# Patient Record
Sex: MALE | Race: WHITE | NOT HISPANIC OR LATINO | Employment: FULL TIME | URBAN - NONMETROPOLITAN AREA
[De-identification: names, ages, dates, MRNs, and addresses within clinical notes are randomized per-mention and may not be internally consistent; named-entity substitution may affect disease eponyms.]

---

## 2018-02-02 ENCOUNTER — HOSPITAL ENCOUNTER (OUTPATIENT)
Dept: GENERAL RADIOLOGY | Facility: HOSPITAL | Age: 53
Discharge: HOME | End: 2018-02-02
Admitting: NURSE PRACTITIONER

## 2018-02-02 ENCOUNTER — TRANSCRIBE ORDERS (OUTPATIENT)
Dept: LAB | Facility: HOSPITAL | Age: 53
End: 2018-02-02

## 2018-02-02 DIAGNOSIS — R05.9 COUGH: Primary | ICD-10-CM

## 2018-02-02 DIAGNOSIS — R05.9 COUGH: ICD-10-CM

## 2018-02-02 PROCEDURE — 71046 X-RAY EXAM CHEST 2 VIEWS: CPT | Performed by: RADIOLOGY

## 2018-02-02 PROCEDURE — 71046 X-RAY EXAM CHEST 2 VIEWS: CPT

## 2018-04-02 ENCOUNTER — APPOINTMENT (OUTPATIENT)
Dept: CT IMAGING | Facility: HOSPITAL | Age: 53
End: 2018-04-02

## 2018-04-02 ENCOUNTER — HOSPITAL ENCOUNTER (EMERGENCY)
Facility: HOSPITAL | Age: 53
Discharge: HOME | End: 2018-04-02
Attending: EMERGENCY MEDICINE | Admitting: EMERGENCY MEDICINE

## 2018-04-02 VITALS
HEIGHT: 69 IN | RESPIRATION RATE: 18 BRPM | OXYGEN SATURATION: 98 % | DIASTOLIC BLOOD PRESSURE: 90 MMHG | WEIGHT: 187 LBS | TEMPERATURE: 97.9 F | HEART RATE: 86 BPM | SYSTOLIC BLOOD PRESSURE: 135 MMHG | BODY MASS INDEX: 27.7 KG/M2

## 2018-04-02 DIAGNOSIS — N20.0 NEPHROLITHIASIS: Primary | ICD-10-CM

## 2018-04-02 DIAGNOSIS — N28.1 BILATERAL RENAL CYSTS: ICD-10-CM

## 2018-04-02 LAB
ALBUMIN SERPL-MCNC: 4.2 G/DL (ref 3.5–5)
ALBUMIN/GLOB SERPL: 1.4 G/DL (ref 1.5–2.5)
ALP SERPL-CCNC: 62 U/L (ref 40–129)
ALT SERPL W P-5'-P-CCNC: 34 U/L (ref 10–44)
ANION GAP SERPL CALCULATED.3IONS-SCNC: 10.2 MMOL/L (ref 3.6–11.2)
AST SERPL-CCNC: 24 U/L (ref 10–34)
BACTERIA UR QL AUTO: ABNORMAL /HPF
BASOPHILS # BLD AUTO: 0.02 10*3/MM3 (ref 0–0.3)
BASOPHILS NFR BLD AUTO: 0.1 % (ref 0–2)
BILIRUB SERPL-MCNC: 0.6 MG/DL (ref 0.2–1.8)
BILIRUB UR QL STRIP: NEGATIVE
BUN BLD-MCNC: 26 MG/DL (ref 7–21)
BUN/CREAT SERPL: 24.8 (ref 7–25)
CALCIUM SPEC-SCNC: 9.3 MG/DL (ref 7.7–10)
CHLORIDE SERPL-SCNC: 104 MMOL/L (ref 99–112)
CLARITY UR: CLEAR
CO2 SERPL-SCNC: 27.8 MMOL/L (ref 24.3–31.9)
COLOR UR: YELLOW
CREAT BLD-MCNC: 1.05 MG/DL (ref 0.43–1.29)
DEPRECATED RDW RBC AUTO: 43.4 FL (ref 37–54)
EOSINOPHIL # BLD AUTO: 0.02 10*3/MM3 (ref 0–0.7)
EOSINOPHIL NFR BLD AUTO: 0.1 % (ref 0–5)
ERYTHROCYTE [DISTWIDTH] IN BLOOD BY AUTOMATED COUNT: 13.4 % (ref 11.5–14.5)
GFR SERPL CREATININE-BSD FRML MDRD: 74 ML/MIN/1.73
GLOBULIN UR ELPH-MCNC: 3 GM/DL
GLUCOSE BLD-MCNC: 107 MG/DL (ref 70–110)
GLUCOSE UR STRIP-MCNC: NEGATIVE MG/DL
HCT VFR BLD AUTO: 45.6 % (ref 42–52)
HGB BLD-MCNC: 15.5 G/DL (ref 14–18)
HGB UR QL STRIP.AUTO: ABNORMAL
HYALINE CASTS UR QL AUTO: ABNORMAL /LPF
IMM GRANULOCYTES # BLD: 0.21 10*3/MM3 (ref 0–0.03)
IMM GRANULOCYTES NFR BLD: 1.2 % (ref 0–0.5)
KETONES UR QL STRIP: NEGATIVE
LEUKOCYTE ESTERASE UR QL STRIP.AUTO: ABNORMAL
LIPASE SERPL-CCNC: 42 U/L (ref 13–60)
LYMPHOCYTES # BLD AUTO: 1.74 10*3/MM3 (ref 1–3)
LYMPHOCYTES NFR BLD AUTO: 10.3 % (ref 21–51)
MCH RBC QN AUTO: 30.7 PG (ref 27–33)
MCHC RBC AUTO-ENTMCNC: 34 G/DL (ref 33–37)
MCV RBC AUTO: 90.3 FL (ref 80–94)
MONOCYTES # BLD AUTO: 1.51 10*3/MM3 (ref 0.1–0.9)
MONOCYTES NFR BLD AUTO: 8.9 % (ref 0–10)
NEUTROPHILS # BLD AUTO: 13.39 10*3/MM3 (ref 1.4–6.5)
NEUTROPHILS NFR BLD AUTO: 79.4 % (ref 30–70)
NITRITE UR QL STRIP: NEGATIVE
OSMOLALITY SERPL CALC.SUM OF ELEC: 288.4 MOSM/KG (ref 273–305)
PH UR STRIP.AUTO: <=5 [PH] (ref 5–8)
PLATELET # BLD AUTO: 209 10*3/MM3 (ref 130–400)
PMV BLD AUTO: 12.5 FL (ref 6–10)
POTASSIUM BLD-SCNC: 4.3 MMOL/L (ref 3.5–5.3)
PROT SERPL-MCNC: 7.2 G/DL (ref 6–8)
PROT UR QL STRIP: NEGATIVE
RBC # BLD AUTO: 5.05 10*6/MM3 (ref 4.7–6.1)
RBC # UR: ABNORMAL /HPF
REF LAB TEST METHOD: ABNORMAL
SODIUM BLD-SCNC: 142 MMOL/L (ref 135–153)
SP GR UR STRIP: 1.03 (ref 1–1.03)
SQUAMOUS #/AREA URNS HPF: ABNORMAL /HPF
UROBILINOGEN UR QL STRIP: ABNORMAL
WBC NRBC COR # BLD: 16.89 10*3/MM3 (ref 4.5–12.5)
WBC UR QL AUTO: ABNORMAL /HPF

## 2018-04-02 PROCEDURE — 74176 CT ABD & PELVIS W/O CONTRAST: CPT

## 2018-04-02 PROCEDURE — 83690 ASSAY OF LIPASE: CPT | Performed by: PHYSICIAN ASSISTANT

## 2018-04-02 PROCEDURE — 25010000002 KETOROLAC TROMETHAMINE PER 15 MG: Performed by: PHYSICIAN ASSISTANT

## 2018-04-02 PROCEDURE — 25010000002 PROMETHAZINE PER 50 MG: Performed by: PHYSICIAN ASSISTANT

## 2018-04-02 PROCEDURE — 80053 COMPREHEN METABOLIC PANEL: CPT | Performed by: PHYSICIAN ASSISTANT

## 2018-04-02 PROCEDURE — 74176 CT ABD & PELVIS W/O CONTRAST: CPT | Performed by: RADIOLOGY

## 2018-04-02 PROCEDURE — 96375 TX/PRO/DX INJ NEW DRUG ADDON: CPT

## 2018-04-02 PROCEDURE — 96365 THER/PROPH/DIAG IV INF INIT: CPT

## 2018-04-02 PROCEDURE — 99283 EMERGENCY DEPT VISIT LOW MDM: CPT

## 2018-04-02 PROCEDURE — 85025 COMPLETE CBC W/AUTO DIFF WBC: CPT | Performed by: PHYSICIAN ASSISTANT

## 2018-04-02 PROCEDURE — 81001 URINALYSIS AUTO W/SCOPE: CPT | Performed by: PHYSICIAN ASSISTANT

## 2018-04-02 RX ORDER — KETOROLAC TROMETHAMINE 10 MG/1
10 TABLET, FILM COATED ORAL EVERY 6 HOURS PRN
Qty: 12 TABLET | Refills: 0 | Status: SHIPPED | OUTPATIENT
Start: 2018-04-02 | End: 2020-02-18

## 2018-04-02 RX ORDER — KETOROLAC TROMETHAMINE 30 MG/ML
30 INJECTION, SOLUTION INTRAMUSCULAR; INTRAVENOUS ONCE
Status: COMPLETED | OUTPATIENT
Start: 2018-04-02 | End: 2018-04-02

## 2018-04-02 RX ORDER — ATORVASTATIN CALCIUM 80 MG/1
80 TABLET, FILM COATED ORAL DAILY
COMMUNITY
End: 2019-07-22

## 2018-04-02 RX ORDER — ASPIRIN 81 MG/1
81 TABLET, CHEWABLE ORAL DAILY
COMMUNITY
End: 2019-07-22

## 2018-04-02 RX ORDER — LISINOPRIL 20 MG/1
20 TABLET ORAL DAILY
COMMUNITY
End: 2020-02-18 | Stop reason: SDUPTHER

## 2018-04-02 RX ORDER — POTASSIUM CITRATE 10 MEQ/1
10 TABLET, EXTENDED RELEASE ORAL
COMMUNITY
End: 2020-02-18 | Stop reason: SDUPTHER

## 2018-04-02 RX ORDER — SODIUM CHLORIDE 0.9 % (FLUSH) 0.9 %
10 SYRINGE (ML) INJECTION AS NEEDED
Status: DISCONTINUED | OUTPATIENT
Start: 2018-04-02 | End: 2018-04-02 | Stop reason: HOSPADM

## 2018-04-02 RX ORDER — PROMETHAZINE HYDROCHLORIDE 12.5 MG/1
12.5 TABLET ORAL EVERY 6 HOURS PRN
Qty: 12 TABLET | Refills: 0 | Status: SHIPPED | OUTPATIENT
Start: 2018-04-02 | End: 2020-02-18

## 2018-04-02 RX ORDER — OXYCODONE HYDROCHLORIDE AND ACETAMINOPHEN 5; 325 MG/1; MG/1
1 TABLET ORAL EVERY 6 HOURS PRN
Qty: 12 TABLET | Refills: 0 | Status: SHIPPED | OUTPATIENT
Start: 2018-04-02 | End: 2020-02-18

## 2018-04-02 RX ADMIN — PROMETHAZINE HYDROCHLORIDE 12.5 MG: 25 INJECTION INTRAMUSCULAR; INTRAVENOUS at 10:35

## 2018-04-02 RX ADMIN — KETOROLAC TROMETHAMINE 30 MG: 30 INJECTION, SOLUTION INTRAMUSCULAR; INTRAVENOUS at 10:35

## 2018-04-02 NOTE — ED PROVIDER NOTES
Subjective     History provided by:  Patient   used: No    Flank Pain   Pain location:  L flank  Pain quality: sharp    Pain radiates to:  Suprapubic region and groin  Pain severity:  Moderate  Duration:  1 day  Timing:  Constant  Progression:  Worsening  Chronicity:  New  Context: not awakening from sleep and not recent illness    Relieved by:  Nothing  Worsened by:  Movement  Ineffective treatments:  Urination  Associated symptoms: nausea    Associated symptoms: no chest pain, no dysuria, no fever, no hematuria and no vomiting    Nausea:     Severity:  Mild  Risk factors: not elderly and not obese    Risk factors comment:  Hx of kidney stones approx 12 years ago      Review of Systems   Constitutional: Negative.  Negative for fever.   HENT: Negative.    Respiratory: Negative.    Cardiovascular: Negative.  Negative for chest pain.   Gastrointestinal: Positive for nausea. Negative for abdominal pain and vomiting.   Endocrine: Negative.    Genitourinary: Positive for flank pain and urgency. Negative for dysuria and hematuria.   Skin: Negative.    Neurological: Negative.    Psychiatric/Behavioral: Negative.    All other systems reviewed and are negative.      Past Medical History:   Diagnosis Date   • Hyperlipidemia    • Hypertension        No Known Allergies    Past Surgical History:   Procedure Laterality Date   • JOINT REPLACEMENT         History reviewed. No pertinent family history.    Social History     Social History   • Marital status:      Social History Main Topics   • Smoking status: Never Smoker   • Alcohol use No   • Drug use: No     Other Topics Concern   • Not on file           Objective   Physical Exam   Constitutional: He is oriented to person, place, and time. He appears well-developed and well-nourished. No distress.   HENT:   Head: Normocephalic and atraumatic.   Right Ear: External ear normal.   Left Ear: External ear normal.   Nose: Nose normal.   Eyes: Conjunctivae and  EOM are normal. Pupils are equal, round, and reactive to light.   Neck: Normal range of motion. Neck supple. No JVD present. No tracheal deviation present.   Cardiovascular: Normal rate, regular rhythm and normal heart sounds.    No murmur heard.  Pulmonary/Chest: Effort normal and breath sounds normal. No respiratory distress. He has no wheezes.   Abdominal: Soft. Bowel sounds are normal. There is no tenderness. There is CVA tenderness.   Musculoskeletal: Normal range of motion. He exhibits no edema or deformity.   Neurological: He is alert and oriented to person, place, and time. No cranial nerve deficit.   Skin: Skin is warm and dry. No rash noted. He is not diaphoretic. No erythema. No pallor.   Psychiatric: He has a normal mood and affect. His behavior is normal. Thought content normal.   Nursing note and vitals reviewed.      Procedures         ED Course  ED Course   Comment By Time   CT results discussed with patient and wife at bedside. Recommend that he follow up with urologist and PCP for renal cysts. Alberto Gabriel PA-C 04/02 1120                  MDM  Number of Diagnoses or Management Options  Bilateral renal cysts: new and does not require workup  Nephrolithiasis: new and requires workup     Amount and/or Complexity of Data Reviewed  Clinical lab tests: reviewed and ordered  Tests in the radiology section of CPT®: reviewed and ordered  Discuss the patient with other providers: yes  Independent visualization of images, tracings, or specimens: yes    Risk of Complications, Morbidity, and/or Mortality  Presenting problems: moderate  Diagnostic procedures: moderate  Management options: moderate    Patient Progress  Patient progress: stable      Final diagnoses:   Nephrolithiasis   Bilateral renal cysts            Alberto Gabriel PA-C  04/02/18 1131

## 2018-04-02 NOTE — ED NOTES
Pt says he was having some mild pain in left lower abdomen to the testicle during the night and became unbearable this morning.     Rodrick Chappell RN  04/02/18 1019

## 2018-04-20 ENCOUNTER — OFFICE VISIT (OUTPATIENT)
Dept: UROLOGY | Facility: CLINIC | Age: 53
End: 2018-04-20

## 2018-04-20 VITALS — HEIGHT: 69 IN | BODY MASS INDEX: 27.69 KG/M2 | WEIGHT: 186.95 LBS

## 2018-04-20 DIAGNOSIS — N20.1 URETERAL CALCULUS: Primary | ICD-10-CM

## 2018-04-20 DIAGNOSIS — N28.1 RENAL CYST: ICD-10-CM

## 2018-04-20 PROCEDURE — 99204 OFFICE O/P NEW MOD 45 MIN: CPT | Performed by: UROLOGY

## 2018-04-20 RX ORDER — HYDROCODONE BITARTRATE AND ACETAMINOPHEN 5; 325 MG/1; MG/1
TABLET ORAL
Qty: 12 TABLET | Refills: 0 | Status: SHIPPED | OUTPATIENT
Start: 2018-04-20 | End: 2020-02-18

## 2018-04-20 RX ORDER — TAMSULOSIN HYDROCHLORIDE 0.4 MG/1
1 CAPSULE ORAL NIGHTLY
Qty: 14 CAPSULE | Refills: 0 | Status: SHIPPED | OUTPATIENT
Start: 2018-04-20 | End: 2020-02-25 | Stop reason: SDUPTHER

## 2018-04-20 NOTE — PROGRESS NOTES
Chief Complaint:          Chief Complaint   Patient presents with   • Nephrolithiasis       HPI:   52 y.o. male.  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  His last visit 2006 he had a 81/2 mm Achillis treated with lithotripsy in [place],  he had stents at that time -currently, he has no nausea, vomiting, fevers, chills, frequency or urgency there is mild testalgia he has a distal left ureteral calculus.  He has a right lower pole renal calculus he is bilateral renal cystic disease and he has significant prostatic calcifications.  His PSAs of been normal and the 2 range.  He has a positive family history of prostate cancer I we did discuss the 28% risk of prostate cancer given his positive family history in his father.  We also discussed the fact that he'll probably passed the stone.Ureteral calculus-patient has been diagnosed with a ureteral calculus.  We have discussed the various parameters regarding spontaneous passage including the notion that a 2 mm stone has a high likelihood of spontaneous passage versus a larger stone being caught up in the upper areas of the urinary tract.  We also discussed the medical management of stone disease and the use of medical expulsive therapy in the form of Flomax.  This is used in an off label setting.  We discussed the indicators for intervention including  absolute indicators such as sepsis and uncontrollable severe pain as well as  the relative indicators of moderate pain that is well-controlled with various analgesia.  I also talked about nonoperative management including ambulation and increasing fluids and hot tub as being an effective adjuncts in the treatment of a ureteral stone.  Is currently pain-free and I suspect he may have passed the stone    Past Medical History:        Past Medical History:   Diagnosis Date   • Hyperlipidemia    • Hypertension          Current Meds:     Current Outpatient  Prescriptions   Medication Sig Dispense Refill   • aspirin 81 MG chewable tablet Chew 81 mg Daily.     • atorvastatin (LIPITOR) 80 MG tablet Take 80 mg by mouth Daily.     • ketorolac (TORADOL) 10 MG tablet Take 1 tablet by mouth Every 6 (Six) Hours As Needed for Moderate Pain . 12 tablet 0   • lisinopril (PRINIVIL,ZESTRIL) 20 MG tablet Take 20 mg by mouth Daily.     • oxyCODONE-acetaminophen (PERCOCET) 5-325 MG per tablet Take 1 tablet by mouth Every 6 (Six) Hours As Needed for Moderate Pain . 12 tablet 0   • potassium citrate (UROCIT-K) 10 MEQ (1080 MG) CR tablet Take 10 mEq by mouth 3 (Three) Times a Day With Meals.     • promethazine (PHENERGAN) 12.5 MG tablet Take 1 tablet by mouth Every 6 (Six) Hours As Needed for Nausea or Vomiting. 12 tablet 0     No current facility-administered medications for this visit.         Allergies:      No Known Allergies     Past Surgical History:     Past Surgical History:   Procedure Laterality Date   • JOINT REPLACEMENT           Social History:     Social History     Social History   • Marital status:      Spouse name: N/A   • Number of children: N/A   • Years of education: N/A     Occupational History   • Not on file.     Social History Main Topics   • Smoking status: Never Smoker   • Smokeless tobacco: Not on file   • Alcohol use No   • Drug use: No   • Sexual activity: Not on file     Other Topics Concern   • Not on file     Social History Narrative   • No narrative on file       Family History:     No family history on file.    Review of Systems:     Review of Systems   Constitutional: Negative.    HENT: Negative.    Eyes: Negative.    Respiratory: Negative.    Cardiovascular: Negative.    Gastrointestinal: Negative.    Endocrine: Negative.    Genitourinary: Positive for testicular pain.   Musculoskeletal: Negative.    Allergic/Immunologic: Negative.    Neurological: Negative.    Hematological: Negative.    Psychiatric/Behavioral: Negative.        Physical Exam:      Physical Exam   Constitutional: He is oriented to person, place, and time. He appears well-developed and well-nourished.   HENT:   Head: Normocephalic and atraumatic.   Neck: Normal range of motion.   Pulmonary/Chest: Effort normal.   Musculoskeletal: Normal range of motion.   Neurological: He is alert and oriented to person, place, and time.   Skin: Skin is warm and dry.   Psychiatric: He has a normal mood and affect. His behavior is normal. Judgment and thought content normal.   Nursing note and vitals reviewed.      I have reviewed the following portions of the patient's history: allergies, current medications, past family history, past medical history, past social history, past surgical history, problem list and ROS and confirm it's accurate.      Procedure:       Assessment/Plan:   Ureteral calculus-patient has been diagnosed with a ureteral calculus.  We have discussed the various parameters regarding spontaneous passage including the notion that a 2 mm stone has a high likelihood of spontaneous passage versus a larger stone being caught up in the upper areas of the urinary tract.  We also discussed the medical management of stone disease and the use of medical expulsive therapy in the form of Flomax.  This is used in an off label setting.  We discussed the indicators for intervention including  absolute indicators such as sepsis and uncontrollable severe pain as well as  the relative indicators of moderate pain that is well-controlled with various analgesia.  I also talked about nonoperative management including ambulation and increasing fluids and hot tub as being an effective adjuncts in the treatment of a ureteral stone.  Is a 5 mm distal left ureteral stone with a high likelihood of spontaneous passage.  Low TestosteroneThis pleasant male patient presents today with signs and symptoms that are consistent with low testosterone he has positive Temo questionnaire by history this includes both the sexual  and nonsexual side effects.  Sexual side effects include inability to achieve and maintain an erection, in ability to maintain his erection and decreased interest and sexual activity.  Nonsexual symptomatology includes fatigue, difficulty completing a job, tiredness.  He has a discussion of the various forms testosterone available including parenteral, topical, and the form of a patch.  We discussed the efficacy of the gels, and the injections.  As well as the cost and benefits analysis.  We discussed the the studies a talked about heart disease and its effect on prostate cancer both of which are negligible.  He gives verbal consent to proceed with treatment.  He understands the risks and benefits of length he also completed his attempts at fertility he understands the partial effect on spermatogenesis       Patient's Body mass index is 27.59 kg/m². BMI is above normal parameters. Follow-up plan includes:  educational material.          This document has been electronically signed by JOSE M IBANEZ MD April 20, 2018 8:18 AM

## 2018-04-21 PROBLEM — N20.1 URETERAL CALCULUS: Status: ACTIVE | Noted: 2018-04-21

## 2018-04-21 PROBLEM — N28.1 RENAL CYST: Status: ACTIVE | Noted: 2018-04-21

## 2018-07-10 ENCOUNTER — TRANSCRIBE ORDERS (OUTPATIENT)
Dept: ADMINISTRATIVE | Facility: HOSPITAL | Age: 53
End: 2018-07-10

## 2018-07-10 DIAGNOSIS — N50.89 OTHER SPECIFIED DISORDERS OF MALE GENITAL ORGANS: Primary | ICD-10-CM

## 2018-07-20 ENCOUNTER — APPOINTMENT (OUTPATIENT)
Dept: ULTRASOUND IMAGING | Facility: HOSPITAL | Age: 53
End: 2018-07-20

## 2018-07-26 ENCOUNTER — HOSPITAL ENCOUNTER (OUTPATIENT)
Dept: ULTRASOUND IMAGING | Facility: HOSPITAL | Age: 53
Discharge: HOME | End: 2018-07-26
Admitting: FAMILY MEDICINE

## 2018-07-26 DIAGNOSIS — N50.89 OTHER SPECIFIED DISORDERS OF MALE GENITAL ORGANS: ICD-10-CM

## 2018-07-26 PROCEDURE — 76870 US EXAM SCROTUM: CPT | Performed by: RADIOLOGY

## 2018-07-26 PROCEDURE — 76870 US EXAM SCROTUM: CPT

## 2018-10-11 ENCOUNTER — TRANSCRIBE ORDERS (OUTPATIENT)
Dept: ADMINISTRATIVE | Facility: HOSPITAL | Age: 53
End: 2018-10-11

## 2018-10-11 ENCOUNTER — APPOINTMENT (OUTPATIENT)
Dept: LAB | Facility: HOSPITAL | Age: 53
End: 2018-10-11

## 2018-10-11 DIAGNOSIS — Z12.5 SCREENING FOR PROSTATE CANCER: Primary | ICD-10-CM

## 2018-10-11 LAB — PSA SERPL-MCNC: 3.42 NG/ML (ref 0–4)

## 2018-10-11 PROCEDURE — 36415 COLL VENOUS BLD VENIPUNCTURE: CPT | Performed by: NURSE PRACTITIONER

## 2018-10-11 PROCEDURE — 84153 ASSAY OF PSA TOTAL: CPT | Performed by: NURSE PRACTITIONER

## 2019-04-15 RX ORDER — LISINOPRIL 20 MG/1
20 TABLET ORAL DAILY
Qty: 90 TABLET | Refills: 0 | Status: CANCELLED | OUTPATIENT
Start: 2019-04-15

## 2019-07-04 ENCOUNTER — LAB (OUTPATIENT)
Dept: LAB | Facility: HOSPITAL | Age: 54
End: 2019-07-04

## 2019-07-04 ENCOUNTER — TRANSCRIBE ORDERS (OUTPATIENT)
Dept: ADMINISTRATIVE | Facility: HOSPITAL | Age: 54
End: 2019-07-04

## 2019-07-04 DIAGNOSIS — I10 ESSENTIAL (PRIMARY) HYPERTENSION: ICD-10-CM

## 2019-07-04 DIAGNOSIS — I10 ESSENTIAL HYPERTENSION: ICD-10-CM

## 2019-07-04 DIAGNOSIS — I10 ESSENTIAL HYPERTENSION: Primary | ICD-10-CM

## 2019-07-04 LAB
ALBUMIN SERPL-MCNC: 4.4 G/DL (ref 3.5–5.2)
ALBUMIN/GLOB SERPL: 1.6 G/DL
ALP SERPL-CCNC: 56 U/L (ref 39–117)
ALT SERPL W P-5'-P-CCNC: 23 U/L (ref 1–41)
ANION GAP SERPL CALCULATED.3IONS-SCNC: 14.7 MMOL/L (ref 5–15)
AST SERPL-CCNC: 32 U/L (ref 1–40)
BASOPHILS # BLD AUTO: 0.06 10*3/MM3 (ref 0–0.2)
BASOPHILS NFR BLD AUTO: 0.7 % (ref 0–1.5)
BILIRUB SERPL-MCNC: 0.4 MG/DL (ref 0.2–1.2)
BUN BLD-MCNC: 19 MG/DL (ref 6–20)
BUN/CREAT SERPL: 22.1 (ref 7–25)
CALCIUM SPEC-SCNC: 10 MG/DL (ref 8.6–10.5)
CHLORIDE SERPL-SCNC: 101 MMOL/L (ref 98–107)
CHOLEST SERPL-MCNC: 185 MG/DL (ref 0–200)
CO2 SERPL-SCNC: 25.3 MMOL/L (ref 22–29)
CREAT BLD-MCNC: 0.86 MG/DL (ref 0.76–1.27)
DEPRECATED RDW RBC AUTO: 48.1 FL (ref 37–54)
EOSINOPHIL # BLD AUTO: 0.21 10*3/MM3 (ref 0–0.4)
EOSINOPHIL NFR BLD AUTO: 2.3 % (ref 0.3–6.2)
ERYTHROCYTE [DISTWIDTH] IN BLOOD BY AUTOMATED COUNT: 13.4 % (ref 12.3–15.4)
GFR SERPL CREATININE-BSD FRML MDRD: 92 ML/MIN/1.73
GLOBULIN UR ELPH-MCNC: 2.7 GM/DL
GLUCOSE BLD-MCNC: 60 MG/DL (ref 65–99)
HCT VFR BLD AUTO: 45.3 % (ref 37.5–51)
HDLC SERPL-MCNC: 48 MG/DL (ref 40–60)
HGB BLD-MCNC: 14.3 G/DL (ref 13–17.7)
LDLC SERPL CALC-MCNC: 110 MG/DL (ref 0–100)
LDLC/HDLC SERPL: 2.29 {RATIO}
LYMPHOCYTES # BLD AUTO: 2.18 10*3/MM3 (ref 0.7–3.1)
LYMPHOCYTES NFR BLD AUTO: 24.1 % (ref 19.6–45.3)
MCH RBC QN AUTO: 30.6 PG (ref 26.6–33)
MCHC RBC AUTO-ENTMCNC: 31.6 G/DL (ref 31.5–35.7)
MCV RBC AUTO: 97 FL (ref 79–97)
MONOCYTES # BLD AUTO: 0.82 10*3/MM3 (ref 0.1–0.9)
MONOCYTES NFR BLD AUTO: 9.1 % (ref 5–12)
NEUTROPHILS # BLD AUTO: 5.73 10*3/MM3 (ref 1.7–7)
NEUTROPHILS NFR BLD AUTO: 63.4 % (ref 42.7–76)
PLATELET # BLD AUTO: 188 10*3/MM3 (ref 140–450)
PMV BLD AUTO: 12.8 FL (ref 6–12)
POTASSIUM BLD-SCNC: 4.5 MMOL/L (ref 3.5–5.2)
PROT SERPL-MCNC: 7.1 G/DL (ref 6–8.5)
PSA SERPL-MCNC: 4.46 NG/ML (ref 0–4)
RBC # BLD AUTO: 4.67 10*6/MM3 (ref 4.14–5.8)
SODIUM BLD-SCNC: 141 MMOL/L (ref 136–145)
TRIGL SERPL-MCNC: 135 MG/DL (ref 0–150)
VLDLC SERPL-MCNC: 27 MG/DL (ref 5–40)
WBC NRBC COR # BLD: 9.04 10*3/MM3 (ref 3.4–10.8)

## 2019-07-04 PROCEDURE — 80061 LIPID PANEL: CPT | Performed by: FAMILY MEDICINE

## 2019-07-04 PROCEDURE — 84153 ASSAY OF PSA TOTAL: CPT | Performed by: FAMILY MEDICINE

## 2019-07-04 PROCEDURE — 36415 COLL VENOUS BLD VENIPUNCTURE: CPT | Performed by: FAMILY MEDICINE

## 2019-07-04 PROCEDURE — 85025 COMPLETE CBC W/AUTO DIFF WBC: CPT

## 2019-07-04 PROCEDURE — 80053 COMPREHEN METABOLIC PANEL: CPT | Performed by: FAMILY MEDICINE

## 2019-07-22 ENCOUNTER — OFFICE VISIT (OUTPATIENT)
Dept: UROLOGY | Facility: CLINIC | Age: 54
End: 2019-07-22

## 2019-07-22 VITALS — SYSTOLIC BLOOD PRESSURE: 136 MMHG | BODY MASS INDEX: 22.26 KG/M2 | DIASTOLIC BLOOD PRESSURE: 78 MMHG | WEIGHT: 150.8 LBS

## 2019-07-22 DIAGNOSIS — C61 PROSTATE CANCER (HCC): Primary | ICD-10-CM

## 2019-07-22 DIAGNOSIS — R97.20 ELEVATED PROSTATE SPECIFIC ANTIGEN (PSA): ICD-10-CM

## 2019-07-22 DIAGNOSIS — Z80.42 FAMILY HISTORY OF PROSTATE CANCER IN FATHER: ICD-10-CM

## 2019-07-22 DIAGNOSIS — N42.9 DISORDER OF PROSTATE: ICD-10-CM

## 2019-07-22 PROCEDURE — 36415 COLL VENOUS BLD VENIPUNCTURE: CPT | Performed by: UROLOGY

## 2019-07-22 PROCEDURE — 84154 ASSAY OF PSA FREE: CPT | Performed by: UROLOGY

## 2019-07-22 PROCEDURE — 84153 ASSAY OF PSA TOTAL: CPT | Performed by: UROLOGY

## 2019-07-22 PROCEDURE — 99214 OFFICE O/P EST MOD 30 MIN: CPT | Performed by: UROLOGY

## 2019-07-22 NOTE — PROGRESS NOTES
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  His last visit 2006 he had a 81/2 mm Achillis treated with lithotripsy in [place],  he had stents at that time -currently, he has no nausea, vomiting, fevers, chills, frequency or urgency there is mild testalgia he has a distal left ureteral calculus.  He has a right lower pole renal calculus he is bilateral renal cystic disease and he has significant prostatic calcifications.  His PSAs of been normal and the 2 range.  He has a positive family history of prostate cancer I we did discuss the 28% risk of prostate cancer given his positive family history in his father.  We also discussed the fact that he'll probably passed the stone.Ureteral calculus-patient has been diagnosed with a ureteral calculus.  We have discussed the various parameters regarding spontaneous passage including the notion that a 2 mm stone has a high likelihood of spontaneous passage versus a larger stone being caught up in the upper areas of the urinary tract.  We also discussed the medical management of stone disease and the use of medical expulsive therapy in the form of Flomax.  This is used in an off label setting.  We discussed the indicators for intervention including  absolute indicators such as sepsis and uncontrollable severe pain as well as  the relative indicators of moderate pain that is well-controlled with various analgesia.  I also talked about nonoperative management including ambulation and increasing fluids and hot tub as being an effective adjuncts in the treatment of a ureteral stone.  Is currently pain-free and I suspect he may have passed the stone    He returns today 6 months ago his PSA was 3.4202 weeks ago was 4.460.  He is a positive family history and his dad.  He has no lower urinary tract symptomatology has had a previous  lithotripsy.  I am going to recommend a work-up including a free and total PSA.  His digital rectal was unremarkable    Past Medical History:        Past Medical History:   Diagnosis Date   • Hyperlipidemia    • Hypertension          Current Meds:     Current Outpatient Medications   Medication Sig Dispense Refill   • atorvastatin (LIPITOR) 80 MG tablet Take 1 tablet by mouth Daily. 90 tablet 2   • HYDROcodone-acetaminophen (NORCO) 5-325 MG per tablet 1 to 2 Tablets Every 6 Hours as Needed for PAIN 12 tablet 0   • ketorolac (TORADOL) 10 MG tablet Take 1 tablet by mouth Every 6 (Six) Hours As Needed for Moderate Pain . 12 tablet 0   • lisinopril (PRINIVIL,ZESTRIL) 20 MG tablet Take 20 mg by mouth Daily.     • lisinopril (PRINIVIL,ZESTRIL) 20 MG tablet Take 1 tablet by mouth Daily. 90 tablet 0   • lisinopril (PRINIVIL,ZESTRIL) 20 MG tablet Take 1 tablet by mouth Daily. 90 tablet 3   • oxyCODONE-acetaminophen (PERCOCET) 5-325 MG per tablet Take 1 tablet by mouth Every 6 (Six) Hours As Needed for Moderate Pain . 12 tablet 0   • pantoprazole (PROTONIX) 40 MG EC tablet Take 1 tablet by mouth Daily. 30 tablet 3   • potassium citrate (UROCIT-K) 10 MEQ (1080 MG) CR tablet Take 10 mEq by mouth 3 (Three) Times a Day With Meals.     • potassium citrate (UROCIT-K) 10 MEQ (1080 MG) CR tablet Take 1 tablet by mouth 2 (Two) Times a Day. 180 tablet 2   • potassium citrate (UROCIT-K) 10 MEQ (1080 MG) CR tablet Take 1 tablet by mouth 2 (Two) Times a Day. 180 tablet 3   • promethazine (PHENERGAN) 12.5 MG tablet Take 1 tablet by mouth Every 6 (Six) Hours As Needed for Nausea or Vomiting. 12 tablet 0   • propranolol (INDERAL) 40 MG tablet Take 1 tablet by mouth Daily. 30 tablet 3   • rosuvastatin (CRESTOR) 40 MG tablet Take 1 tablet by mouth Daily. 30 tablet 3   • tamsulosin (FLOMAX) 0.4 MG capsule 24 hr capsule Take 1 capsule by mouth Every Night. 14 capsule 0   • Zoster Vac Recomb Adjuvanted 50 MCG/0.5ML reconstituted suspension Inject  0.5 mL into the appropriate muscle as directed by prescriber. 1 each 0     No current facility-administered medications for this visit.         Allergies:      No Known Allergies     Past Surgical History:     Past Surgical History:   Procedure Laterality Date   • JOINT REPLACEMENT           Social History:     Social History     Socioeconomic History   • Marital status:      Spouse name: Not on file   • Number of children: Not on file   • Years of education: Not on file   • Highest education level: Not on file   Tobacco Use   • Smoking status: Never Smoker   Substance and Sexual Activity   • Alcohol use: No   • Drug use: No       Family History:     No family history on file.    Review of Systems:     Review of Systems   Constitutional: Negative.    HENT: Negative.    Eyes: Negative.    Respiratory: Negative.    Cardiovascular: Negative.    Gastrointestinal: Negative.    Endocrine: Negative.    Musculoskeletal: Negative.    Allergic/Immunologic: Negative.    Neurological: Negative.    Hematological: Negative.    Psychiatric/Behavioral: Negative.        Physical Exam:     Physical Exam   Constitutional: He is oriented to person, place, and time. He appears well-developed and well-nourished.   HENT:   Head: Normocephalic and atraumatic.   Eyes: Conjunctivae and EOM are normal. Pupils are equal, round, and reactive to light.   Neck: Normal range of motion.   Cardiovascular: Normal rate, regular rhythm, normal heart sounds and intact distal pulses.   Pulmonary/Chest: Effort normal and breath sounds normal.   Abdominal: Soft. Bowel sounds are normal.   Genitourinary:   Genitourinary Comments: Soft nontender abdomen with no organomegaly, rigidity, or tenderness.  He has normal external genitalia and uncircumcised phallus with a freely movable foreskin bilaterally descended testes without masses there is no inguinal hernias adenopathy or abnormalities he had good rectal tone and a small smooth firm prostate.  There  is no nodularity or any suspicious rectal abnormalities     Musculoskeletal: Normal range of motion.   Neurological: He is alert and oriented to person, place, and time. He has normal reflexes.   Skin: Skin is warm and dry.   Psychiatric: He has a normal mood and affect. His behavior is normal. Judgment and thought content normal.   Nursing note and vitals reviewed.      I have reviewed the following portions of the patient's history: allergies, current medications, past family history, past medical history, past social history, past surgical history, problem list and ROS and confirm it's accurate.      Procedure:       Assessment/Plan:   Elevated prostate specific antigen-we discussed the diagnosis of elevated prostate-specific antigen.  I explained the pathophysiology of PSA.  It is a serine protease that's function in the male reproductive tract is to facilitate the liquefaction of semen.  It is for this reason the body does not want it freely floating in the serum and why typically bound tightly to albumin.  We discussed why we used both a PSA free and total to determine the need for more aggressive therapy I discussed the normal range.  Additionally it was in the range of 1-4 but more recently has been downgraded to something less than 2 or even approaching 1.  I discussed the risk of family history particularly the fact that the average male has a 14% risk of prostate cancer and that in the face of a positive diagnosis in a father it will tablet and any other first-generation relative continued tablet insofar that a father and brother with prostate cancer will produce almost a 50% risk of prostate cancer.  I discussed the use of the temporal use of PSA as the best option for monitoring.  We also discussed the fact that an elevated PSA is an isolated event does not mean that this is prostate cancer and should not engender worry in this regard. I discussed other things that can elevate PSA including  constipation, prostatitis, infection, recent intercourse etc. as well as the risks and benefits associated with this.  Also discussed the fact that this is with a dilutional test and as a consequence of such were present produce slight variations on a single specimen.  Further discussed the risks and benefits of a prostate biopsy as well.  Free and total PSA pending  Family history of prostate cancer-father            Patient's Body mass index is 22.26 kg/m². BMI is within normal parameters. No follow-up required..      Smoking Cessation Counseling:  Never a smoker.  Patient does not currently use any tobacco products.                     This document has been electronically signed by JOSE M IBANEZ MD July 22, 2019 3:50 PM

## 2019-07-24 PROBLEM — Z80.42 FAMILY HISTORY OF PROSTATE CANCER IN FATHER: Status: ACTIVE | Noted: 2019-07-24

## 2019-07-24 PROBLEM — R97.20 ELEVATED PROSTATE SPECIFIC ANTIGEN (PSA): Status: ACTIVE | Noted: 2019-07-24

## 2019-07-24 LAB
PSA FREE MFR SERPL: 16.3 %
PSA FREE SERPL-MCNC: 0.57 NG/ML
PSA SERPL-MCNC: 3.5 NG/ML (ref 0–4)

## 2019-08-19 ENCOUNTER — TELEPHONE (OUTPATIENT)
Dept: UROLOGY | Facility: CLINIC | Age: 54
End: 2019-08-19

## 2020-02-18 ENCOUNTER — OFFICE VISIT (OUTPATIENT)
Dept: UROLOGY | Facility: CLINIC | Age: 55
End: 2020-02-18

## 2020-02-18 VITALS
HEIGHT: 69 IN | WEIGHT: 152 LBS | BODY MASS INDEX: 22.51 KG/M2 | DIASTOLIC BLOOD PRESSURE: 82 MMHG | SYSTOLIC BLOOD PRESSURE: 118 MMHG

## 2020-02-18 DIAGNOSIS — C61 PROSTATE CANCER (HCC): Primary | ICD-10-CM

## 2020-02-18 DIAGNOSIS — R97.20 ELEVATED PROSTATE SPECIFIC ANTIGEN (PSA): ICD-10-CM

## 2020-02-18 PROCEDURE — 99213 OFFICE O/P EST LOW 20 MIN: CPT | Performed by: UROLOGY

## 2020-02-18 PROCEDURE — 84153 ASSAY OF PSA TOTAL: CPT | Performed by: UROLOGY

## 2020-02-18 PROCEDURE — 36415 COLL VENOUS BLD VENIPUNCTURE: CPT | Performed by: UROLOGY

## 2020-02-18 NOTE — PROGRESS NOTES
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  His last visit 2006 he had a 81/2 mm Achillis treated with lithotripsy in [place],  he had stents at that time -currently, he has no nausea, vomiting, fevers, chills, frequency or urgency there is mild testalgia he has a distal left ureteral calculus.  He has a right lower pole renal calculus he is bilateral renal cystic disease and he has significant prostatic calcifications.  His PSAs of been normal and the 2 range.  He has a positive family history of prostate cancer I we did discuss the 28% risk of prostate cancer given his positive family history in his father.  We also discussed the fact that he'll probably passed the stone.Ureteral calculus-patient has been diagnosed with a ureteral calculus.  We have discussed the various parameters regarding spontaneous passage including the notion that a 2 mm stone has a high likelihood of spontaneous passage versus a larger stone being caught up in the upper areas of the urinary tract.  We also discussed the medical management of stone disease and the use of medical expulsive therapy in the form of Flomax.  This is used in an off label setting.  We discussed the indicators for intervention including  absolute indicators such as sepsis and uncontrollable severe pain as well as  the relative indicators of moderate pain that is well-controlled with various analgesia.  I also talked about nonoperative management including ambulation and increasing fluids and hot tub as being an effective adjuncts in the treatment of a ureteral stone.  Is currently pain-free and I suspect he may have passed the stone    He returns today 6 months ago his PSA was 3.4202 weeks ago was 4.460.  He is a positive family history and his dad.  He has no lower urinary tract symptomatology has had a previous  lithotripsy.  I am going to recommend a work-up including a free and total PSA.  His digital rectal was unremarkable  He returns today.  He is doing great he is had no further stones no further urinary problems.  He is very physically fit.  There is no burning, blood in the urine, discharge he has a repeat PSA pending I will notify him of the results I will check him back in 1 year      Past Medical History:        Past Medical History:   Diagnosis Date   • Benign prostatic hyperplasia April 2018   • Elevated PSA July 2019   • Hyperlipidemia    • Hypertension    • Kidney stone 2004   • Polycystic kidney disease April 2018         Current Meds:     Current Outpatient Medications   Medication Sig Dispense Refill   • lisinopril (PRINIVIL,ZESTRIL) 20 MG tablet Take 1 tablet by mouth Daily. 90 tablet 3   • potassium citrate (UROCIT-K) 10 MEQ (1080 MG) CR tablet Take 1 tablet by mouth 2 (Two) Times a Day. 180 tablet 3   • propranolol (INDERAL) 40 MG tablet Take 1 tablet by mouth Daily. (Patient taking differently: Take 40 mg by mouth As Needed.) 30 tablet 3   • rosuvastatin (CRESTOR) 40 MG tablet Take 1 tablet(s) by mouth daily 30 tablet 3   • tamsulosin (FLOMAX) 0.4 MG capsule 24 hr capsule Take 1 capsule by mouth Every Night. 14 capsule 0     No current facility-administered medications for this visit.         Allergies:      No Known Allergies     Past Surgical History:     Past Surgical History:   Procedure Laterality Date   • JOINT REPLACEMENT     • LITHOTRIPSY     • VASECTOMY  2003         Social History:     Social History     Socioeconomic History   • Marital status:      Spouse name: Not on file   • Number of children: Not on file   • Years of education: Not on file   • Highest education level: Not on file   Tobacco Use   • Smoking status: Never Smoker   Substance and Sexual Activity   • Alcohol use: No   • Drug use: No   • Sexual activity: Yes     Partners: Female     Birth control/protection: Surgical        Family History:     Family History   Problem Relation Age of Onset   • Cancer Father         Bladder ca   • Prostate cancer Father    • Hypertension Mother        Review of Systems:     Review of Systems   Constitutional: Negative.    HENT: Negative.    Eyes: Negative.    Respiratory: Negative.    Cardiovascular: Negative.    Gastrointestinal: Negative.    Endocrine: Negative.    Musculoskeletal: Negative.    Allergic/Immunologic: Negative.    Neurological: Negative.    Hematological: Negative.    Psychiatric/Behavioral: Negative.        Physical Exam:     Physical Exam   Constitutional: He is oriented to person, place, and time. He appears well-developed and well-nourished.   HENT:   Head: Normocephalic and atraumatic.   Eyes: Pupils are equal, round, and reactive to light. Conjunctivae and EOM are normal.   Neck: Normal range of motion.   Cardiovascular: Normal rate, regular rhythm, normal heart sounds and intact distal pulses.   Pulmonary/Chest: Effort normal and breath sounds normal.   Abdominal: Soft. Bowel sounds are normal.   Musculoskeletal: Normal range of motion.   Neurological: He is alert and oriented to person, place, and time. He has normal reflexes.   Skin: Skin is warm and dry.   Psychiatric: He has a normal mood and affect. His behavior is normal. Judgment and thought content normal.   Nursing note and vitals reviewed.      I have reviewed the following portions of the patient's history: allergies, current medications, past family history, past medical history, past social history, past surgical history, problem list and ROS and confirm it's accurate.      Procedure:       Assessment/Plan:   Elevated prostate specific antigen-we discussed the diagnosis of elevated prostate-specific antigen.  I explained the pathophysiology of PSA.  It is a serine protease that's function in the male reproductive tract is to facilitate the liquefaction of semen.  It is for this reason the body does not want it  freely floating in the serum and why typically bound tightly to albumin.  We discussed why we used both a PSA free and total to determine the need for more aggressive therapy I discussed the normal range.  Additionally it was in the range of 1-4 but more recently has been downgraded to something less than 2 or even approaching 1.  I discussed the risk of family history particularly the fact that the average male has a 14% risk of prostate cancer and that in the face of a positive diagnosis in a father it will tablet and any other first-generation relative continued tablet insofar that a father and brother with prostate cancer will produce almost a 50% risk of prostate cancer.  I discussed the use of the temporal use of PSA as the best option for monitoring.  We also discussed the fact that an elevated PSA is an isolated event does not mean that this is prostate cancer and should not engender worry in this regard. I discussed other things that can elevate PSA including constipation, prostatitis, infection, recent intercourse etc. as well as the risks and benefits associated with this.  Also discussed the fact that this is with a dilutional test and as a consequence of such were present produce slight variations on a single specimen.  Further discussed the risks and benefits of a prostate biopsy as well.  PSA pending            Patient's Body mass index is 22.45 kg/m². BMI is within normal parameters. No follow-up required..              This document has been electronically signed by JOSE M IBANEZ MD February 18, 2020 3:47 PM

## 2020-02-19 LAB — PSA SERPL-MCNC: 4.66 NG/ML (ref 0–4)

## 2020-02-24 ENCOUNTER — HOSPITAL ENCOUNTER (EMERGENCY)
Facility: HOSPITAL | Age: 55
Discharge: HOME | End: 2020-02-24
Attending: FAMILY MEDICINE | Admitting: FAMILY MEDICINE

## 2020-02-24 ENCOUNTER — APPOINTMENT (OUTPATIENT)
Dept: GENERAL RADIOLOGY | Facility: HOSPITAL | Age: 55
End: 2020-02-24

## 2020-02-24 ENCOUNTER — APPOINTMENT (OUTPATIENT)
Dept: CT IMAGING | Facility: HOSPITAL | Age: 55
End: 2020-02-24

## 2020-02-24 VITALS
DIASTOLIC BLOOD PRESSURE: 72 MMHG | OXYGEN SATURATION: 100 % | BODY MASS INDEX: 21.77 KG/M2 | WEIGHT: 147 LBS | TEMPERATURE: 97.7 F | RESPIRATION RATE: 18 BRPM | HEIGHT: 69 IN | HEART RATE: 61 BPM | SYSTOLIC BLOOD PRESSURE: 125 MMHG

## 2020-02-24 DIAGNOSIS — N20.1 URETEROLITHIASIS: Primary | ICD-10-CM

## 2020-02-24 LAB
ANION GAP SERPL CALCULATED.3IONS-SCNC: 10.4 MMOL/L (ref 5–15)
BACTERIA UR QL AUTO: ABNORMAL /HPF
BASOPHILS # BLD AUTO: 0.04 10*3/MM3 (ref 0–0.2)
BASOPHILS NFR BLD AUTO: 0.5 % (ref 0–1.5)
BILIRUB UR QL STRIP: NEGATIVE
BUN BLD-MCNC: 24 MG/DL (ref 6–20)
BUN/CREAT SERPL: 26.4 (ref 7–25)
CALCIUM SPEC-SCNC: 9.4 MG/DL (ref 8.6–10.5)
CHLORIDE SERPL-SCNC: 103 MMOL/L (ref 98–107)
CLARITY UR: CLEAR
CO2 SERPL-SCNC: 27.6 MMOL/L (ref 22–29)
COLOR UR: YELLOW
CREAT BLD-MCNC: 0.91 MG/DL (ref 0.76–1.27)
DEPRECATED RDW RBC AUTO: 49 FL (ref 37–54)
EOSINOPHIL # BLD AUTO: 0.16 10*3/MM3 (ref 0–0.4)
EOSINOPHIL NFR BLD AUTO: 2 % (ref 0.3–6.2)
ERYTHROCYTE [DISTWIDTH] IN BLOOD BY AUTOMATED COUNT: 13.5 % (ref 12.3–15.4)
GFR SERPL CREATININE-BSD FRML MDRD: 86 ML/MIN/1.73
GLUCOSE BLD-MCNC: 109 MG/DL (ref 65–99)
GLUCOSE UR STRIP-MCNC: NEGATIVE MG/DL
HCT VFR BLD AUTO: 47.2 % (ref 37.5–51)
HGB BLD-MCNC: 14.8 G/DL (ref 13–17.7)
HGB UR QL STRIP.AUTO: ABNORMAL
HYALINE CASTS UR QL AUTO: ABNORMAL /LPF
IMM GRANULOCYTES # BLD AUTO: 0.03 10*3/MM3 (ref 0–0.05)
IMM GRANULOCYTES NFR BLD AUTO: 0.4 % (ref 0–0.5)
KETONES UR QL STRIP: ABNORMAL
LEUKOCYTE ESTERASE UR QL STRIP.AUTO: ABNORMAL
LYMPHOCYTES # BLD AUTO: 1.33 10*3/MM3 (ref 0.7–3.1)
LYMPHOCYTES NFR BLD AUTO: 16.4 % (ref 19.6–45.3)
MCH RBC QN AUTO: 30.6 PG (ref 26.6–33)
MCHC RBC AUTO-ENTMCNC: 31.4 G/DL (ref 31.5–35.7)
MCV RBC AUTO: 97.5 FL (ref 79–97)
MONOCYTES # BLD AUTO: 0.53 10*3/MM3 (ref 0.1–0.9)
MONOCYTES NFR BLD AUTO: 6.5 % (ref 5–12)
NEUTROPHILS # BLD AUTO: 6.01 10*3/MM3 (ref 1.7–7)
NEUTROPHILS NFR BLD AUTO: 74.2 % (ref 42.7–76)
NITRITE UR QL STRIP: NEGATIVE
NRBC BLD AUTO-RTO: 0 /100 WBC (ref 0–0.2)
PH UR STRIP.AUTO: 6 [PH] (ref 5–8)
PLATELET # BLD AUTO: 154 10*3/MM3 (ref 140–450)
PMV BLD AUTO: 12 FL (ref 6–12)
POTASSIUM BLD-SCNC: 4 MMOL/L (ref 3.5–5.2)
PROT UR QL STRIP: ABNORMAL
RBC # BLD AUTO: 4.84 10*6/MM3 (ref 4.14–5.8)
RBC # UR: ABNORMAL /HPF
REF LAB TEST METHOD: ABNORMAL
SODIUM BLD-SCNC: 141 MMOL/L (ref 136–145)
SP GR UR STRIP: 1.02 (ref 1–1.03)
SQUAMOUS #/AREA URNS HPF: ABNORMAL /HPF
UROBILINOGEN UR QL STRIP: ABNORMAL
WBC NRBC COR # BLD: 8.1 10*3/MM3 (ref 3.4–10.8)
WBC UR QL AUTO: ABNORMAL /HPF

## 2020-02-24 PROCEDURE — 36415 COLL VENOUS BLD VENIPUNCTURE: CPT

## 2020-02-24 PROCEDURE — 85025 COMPLETE CBC W/AUTO DIFF WBC: CPT | Performed by: NURSE PRACTITIONER

## 2020-02-24 PROCEDURE — 74018 RADEX ABDOMEN 1 VIEW: CPT

## 2020-02-24 PROCEDURE — 74018 RADEX ABDOMEN 1 VIEW: CPT | Performed by: RADIOLOGY

## 2020-02-24 PROCEDURE — 80048 BASIC METABOLIC PNL TOTAL CA: CPT | Performed by: NURSE PRACTITIONER

## 2020-02-24 PROCEDURE — 81001 URINALYSIS AUTO W/SCOPE: CPT | Performed by: NURSE PRACTITIONER

## 2020-02-24 PROCEDURE — 74176 CT ABD & PELVIS W/O CONTRAST: CPT

## 2020-02-24 PROCEDURE — 99283 EMERGENCY DEPT VISIT LOW MDM: CPT

## 2020-02-24 PROCEDURE — 96375 TX/PRO/DX INJ NEW DRUG ADDON: CPT

## 2020-02-24 PROCEDURE — 74176 CT ABD & PELVIS W/O CONTRAST: CPT | Performed by: RADIOLOGY

## 2020-02-24 PROCEDURE — 25010000002 KETOROLAC TROMETHAMINE PER 15 MG: Performed by: PHYSICIAN ASSISTANT

## 2020-02-24 PROCEDURE — 96374 THER/PROPH/DIAG INJ IV PUSH: CPT

## 2020-02-24 RX ORDER — SODIUM CHLORIDE 0.9 % (FLUSH) 0.9 %
10 SYRINGE (ML) INJECTION AS NEEDED
Status: DISCONTINUED | OUTPATIENT
Start: 2020-02-24 | End: 2020-02-24 | Stop reason: HOSPADM

## 2020-02-24 RX ORDER — OXYCODONE HYDROCHLORIDE AND ACETAMINOPHEN 5; 325 MG/1; MG/1
1 TABLET ORAL EVERY 4 HOURS PRN
Qty: 8 TABLET | Refills: 0 | Status: SHIPPED | OUTPATIENT
Start: 2020-02-24 | End: 2021-02-19

## 2020-02-24 RX ORDER — KETOROLAC TROMETHAMINE 30 MG/ML
15 INJECTION, SOLUTION INTRAMUSCULAR; INTRAVENOUS ONCE
Status: COMPLETED | OUTPATIENT
Start: 2020-02-24 | End: 2020-02-24

## 2020-02-24 RX ORDER — ONDANSETRON 4 MG/1
4 TABLET, FILM COATED ORAL EVERY 6 HOURS PRN
Qty: 15 TABLET | Refills: 0 | Status: SHIPPED | OUTPATIENT
Start: 2020-02-24 | End: 2022-08-04

## 2020-02-24 RX ADMIN — KETOROLAC TROMETHAMINE 15 MG: 30 INJECTION, SOLUTION INTRAMUSCULAR; INTRAVENOUS at 10:46

## 2020-02-24 RX ADMIN — SODIUM CHLORIDE 1000 ML: 9 INJECTION, SOLUTION INTRAVENOUS at 08:24

## 2020-02-24 RX ADMIN — LIDOCAINE HYDROCHLORIDE 100 MG: 10 INJECTION, SOLUTION INFILTRATION; PERINEURAL at 08:24

## 2020-02-24 NOTE — ED PROVIDER NOTES
Subjective   The patient is a 54 year old male that presents to ED for RLQ abdominal pain and right flank pain. This began at 0400 and has been intermittent. He reports previous similar symptoms with kidney stones. He reports nausea but denies vomiting, diarrhea, or chills. He had a lithotripsy several years ago with stenting for stones. The patient was evaluated last week by Dr. Storey but was not having urinary symptoms at that time.       History provided by:  Patient   used: No    Abdominal Pain   Pain location:  RLQ and R flank  Pain quality: sharp and shooting    Pain severity:  Moderate  Onset quality:  Sudden  Duration:  4 hours  Timing:  Intermittent  Progression:  Waxing and waning  Chronicity:  Recurrent  Context: not alcohol use, not medication withdrawal and not recent illness    Relieved by: flomax.  Worsened by:  Nothing  Associated symptoms: nausea    Associated symptoms: no chills, no dysuria, no hematemesis, no hematochezia and no vomiting    Risk factors: no alcohol abuse, has not had multiple surgeries and no recent hospitalization        Review of Systems   Constitutional: Negative for chills.   HENT: Negative.    Eyes: Negative.    Respiratory: Negative.    Cardiovascular: Negative.    Gastrointestinal: Positive for abdominal pain and nausea. Negative for hematemesis, hematochezia and vomiting.   Endocrine: Negative.    Genitourinary: Positive for testicular pain. Negative for dysuria.   Musculoskeletal: Negative.    Skin: Negative.    Allergic/Immunologic: Negative.    Neurological: Negative.    Hematological: Negative.    Psychiatric/Behavioral: Negative.    All other systems reviewed and are negative.      Past Medical History:   Diagnosis Date   • Benign prostatic hyperplasia April 2018   • Elevated PSA July 2019   • Hyperlipidemia    • Hypertension    • Kidney stone 2004   • Polycystic kidney disease April 2018   • Renal cyst        No Known Allergies    Past Surgical  History:   Procedure Laterality Date   • JOINT REPLACEMENT     • LITHOTRIPSY     • VASECTOMY  2003       Family History   Problem Relation Age of Onset   • Cancer Father         Bladder ca   • Prostate cancer Father    • Hypertension Mother        Social History     Socioeconomic History   • Marital status:      Spouse name: Not on file   • Number of children: Not on file   • Years of education: Not on file   • Highest education level: Not on file   Tobacco Use   • Smoking status: Never Smoker   Substance and Sexual Activity   • Alcohol use: No   • Drug use: No   • Sexual activity: Yes     Partners: Female     Birth control/protection: Surgical           Objective   Physical Exam   Constitutional: He is oriented to person, place, and time. He appears well-developed and well-nourished.   HENT:   Head: Normocephalic.   Eyes: Pupils are equal, round, and reactive to light. EOM are normal.   Cardiovascular: Normal rate, regular rhythm, normal heart sounds and intact distal pulses.   Pulmonary/Chest: Effort normal and breath sounds normal.   Abdominal: Soft. Bowel sounds are normal.   Right CVA tenderness   Musculoskeletal: Normal range of motion.   Neurological: He is alert and oriented to person, place, and time.   Skin: Skin is warm and dry. Capillary refill takes less than 2 seconds.   Psychiatric: He has a normal mood and affect. His behavior is normal. Judgment and thought content normal.   Nursing note and vitals reviewed.      Procedures           ED Course  ED Course as of Feb 24 1052   Mon Feb 24, 2020   0815 Took over for Chaitanya.  Patient states that he started having abrupt right-sided flank pain at around 2.  He has had intermittent episodes since.  He states that he did have some sweats and nausea earlier.    [JI]   0913 Pain is controlled at this time.    [JI]   1007 Discussed with Dr. Storey- will see in office tomorrow.    [JI]      ED Course User Index  [JI] Hakeem Alberts PA                                            MDM  Number of Diagnoses or Management Options  Ureterolithiasis: new and requires workup     Amount and/or Complexity of Data Reviewed  Clinical lab tests: reviewed and ordered  Tests in the radiology section of CPT®: reviewed and ordered  Discuss the patient with other providers: yes  Independent visualization of images, tracings, or specimens: yes    Risk of Complications, Morbidity, and/or Mortality  Presenting problems: moderate        Final diagnoses:   Ureterolithiasis            Hakeem Alberts PA  02/24/20 1052

## 2020-02-25 ENCOUNTER — OFFICE VISIT (OUTPATIENT)
Dept: UROLOGY | Facility: CLINIC | Age: 55
End: 2020-02-25

## 2020-02-25 VITALS — WEIGHT: 147 LBS | BODY MASS INDEX: 21.77 KG/M2 | HEIGHT: 69 IN

## 2020-02-25 DIAGNOSIS — N20.1 URETERAL CALCULUS: ICD-10-CM

## 2020-02-25 PROCEDURE — 99213 OFFICE O/P EST LOW 20 MIN: CPT | Performed by: UROLOGY

## 2020-02-25 RX ORDER — TAMSULOSIN HYDROCHLORIDE 0.4 MG/1
1 CAPSULE ORAL NIGHTLY
Qty: 14 CAPSULE | Refills: 0 | Status: SHIPPED | OUTPATIENT
Start: 2020-02-25 | End: 2022-08-04

## 2020-02-25 NOTE — PROGRESS NOTES
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  His last visit 2006 he had a 81/2 mm Achillis treated with lithotripsy in [place],  he had stents at that time -currently, he has no nausea, vomiting, fevers, chills, frequency or urgency there is mild testalgia he has a distal left ureteral calculus.  He has a right lower pole renal calculus he is bilateral renal cystic disease and he has significant prostatic calcifications.  His PSAs of been normal and the 2 range.  He has a positive family history of prostate cancer I we did discuss the 28% risk of prostate cancer given his positive family history in his father.  We also discussed the fact that he'll probably passed the stone.Ureteral calculus-patient has been diagnosed with a ureteral calculus.  We have discussed the various parameters regarding spontaneous passage including the notion that a 2 mm stone has a high likelihood of spontaneous passage versus a larger stone being caught up in the upper areas of the urinary tract.  We also discussed the medical management of stone disease and the use of medical expulsive therapy in the form of Flomax.  This is used in an off label setting.  We discussed the indicators for intervention including  absolute indicators such as sepsis and uncontrollable severe pain as well as  the relative indicators of moderate pain that is well-controlled with various analgesia.  I also talked about nonoperative management including ambulation and increasing fluids and hot tub as being an effective adjuncts in the treatment of a ureteral stone.  Is currently pain-free and I suspect he may have passed the stone    He returns today 6 months ago his PSA was 3.4202 weeks ago was 4.460.  He is a positive family history and his dad.  He has no lower urinary tract symptomatology has had a previous  lithotripsy.  I am going to recommend a work-up including a free and total PSA.  His digital rectal was unremarkable  He returns today.  He is doing great he is had no further stones no further urinary problems.  He is very physically fit.  There is no burning, blood in the urine, discharge he has a repeat PSA pending I will notify him of the results I will check him back in 1 year.  Apparently after being seen he went on a long walk dropped stone in the right proximal ureter.  Is about 5.5 mm and has a good chance of passing is not having a great deal of pain.  He has adequate pain medication he has adequate Flomax I will see him back in 1 week and we will watch him closely I think he will pass the stone.      Past Medical History:        Past Medical History:   Diagnosis Date   • Benign prostatic hyperplasia April 2018   • Elevated PSA July 2019   • Hyperlipidemia    • Hypertension    • Kidney stone 2004   • Polycystic kidney disease April 2018   • Renal cyst          Current Meds:     Current Outpatient Medications   Medication Sig Dispense Refill   • lisinopril (PRINIVIL,ZESTRIL) 20 MG tablet Take 1 tablet by mouth Daily. 90 tablet 3   • ondansetron (ZOFRAN) 4 MG tablet Take 1 tablet by mouth Every 6 (Six) Hours As Needed for Nausea or Vomiting. 15 tablet 0   • oxyCODONE-acetaminophen (PERCOCET) 5-325 MG per tablet Take 1 tablet by mouth Every 4 (Four) Hours As Needed for Severe Pain . 8 tablet 0   • potassium citrate (UROCIT-K) 10 MEQ (1080 MG) CR tablet Take 1 tablet by mouth 2 (Two) Times a Day. 180 tablet 3   • propranolol (INDERAL) 40 MG tablet Take 1 tablet by mouth Daily. (Patient taking differently: Take 40 mg by mouth As Needed.) 30 tablet 3   • rosuvastatin (CRESTOR) 40 MG tablet Take 1 tablet(s) by mouth daily 30 tablet 3   • tamsulosin (FLOMAX) 0.4 MG capsule 24 hr capsule Take 1 capsule by mouth Every Night. 14 capsule 0     No current facility-administered medications for this visit.          Allergies:      No Known Allergies     Past Surgical History:     Past Surgical History:   Procedure Laterality Date   • JOINT REPLACEMENT     • LITHOTRIPSY     • VASECTOMY  2003         Social History:     Social History     Socioeconomic History   • Marital status:      Spouse name: Not on file   • Number of children: Not on file   • Years of education: Not on file   • Highest education level: Not on file   Tobacco Use   • Smoking status: Never Smoker   Substance and Sexual Activity   • Alcohol use: No   • Drug use: No   • Sexual activity: Yes     Partners: Female     Birth control/protection: Surgical       Family History:     Family History   Problem Relation Age of Onset   • Cancer Father         Bladder ca   • Prostate cancer Father    • Hypertension Mother        Review of Systems:     Review of Systems   Constitutional: Negative.    HENT: Negative.    Eyes: Negative.    Respiratory: Negative.    Cardiovascular: Negative.    Gastrointestinal: Negative.    Endocrine: Negative.    Musculoskeletal: Negative.    Allergic/Immunologic: Negative.    Neurological: Negative.    Hematological: Negative.    Psychiatric/Behavioral: Negative.        Physical Exam:     Physical Exam   Constitutional: He is oriented to person, place, and time. He appears well-developed and well-nourished.   HENT:   Head: Normocephalic and atraumatic.   Eyes: Pupils are equal, round, and reactive to light. Conjunctivae and EOM are normal.   Neck: Normal range of motion.   Cardiovascular: Normal rate, regular rhythm, normal heart sounds and intact distal pulses.   Pulmonary/Chest: Effort normal and breath sounds normal.   Abdominal: Soft. Bowel sounds are normal.   Musculoskeletal: Normal range of motion.   Neurological: He is alert and oriented to person, place, and time. He has normal reflexes.   Skin: Skin is warm and dry.   Psychiatric: He has a normal mood and affect. His behavior is normal. Judgment and thought content normal.    Nursing note and vitals reviewed.      I have reviewed the following portions of the patient's history: allergies, current medications, past family history, past medical history, past social history, past surgical history, problem list and ROS and confirm it's accurate.    Procedure:       Assessment/Plan:   Ureteral calculus-patient has been diagnosed with a ureteral calculus.  We have discussed the various parameters regarding spontaneous passage including the notion that a 2 mm stone has a high likelihood of spontaneous passage versus a larger stone being caught up in the upper areas of the urinary tract.  We also discussed the medical management of stone disease and the use of medical expulsive therapy in the form of Flomax.  This is used in an off label setting.  We discussed the indicators for intervention including  absolute indicators such as sepsis and uncontrollable severe pain as well as  the relative indicators of moderate pain that is well-controlled with various analgesia.  I also talked about nonoperative management including ambulation and increasing fluids and hot tub as being an effective adjuncts in the treatment of a ureteral stone.               Patient's Body mass index is 21.7 kg/m². BMI is within normal parameters. No follow-up required..              This document has been electronically signed by JOSE M IBANEZ MD February 25, 2020 9:53 AM

## 2020-03-02 ENCOUNTER — TRANSCRIBE ORDERS (OUTPATIENT)
Dept: ADMINISTRATIVE | Facility: HOSPITAL | Age: 55
End: 2020-03-02

## 2020-03-02 ENCOUNTER — LAB (OUTPATIENT)
Dept: LAB | Facility: HOSPITAL | Age: 55
End: 2020-03-02

## 2020-03-02 DIAGNOSIS — E78.5 HYPERLIPIDEMIA, UNSPECIFIED HYPERLIPIDEMIA TYPE: ICD-10-CM

## 2020-03-02 DIAGNOSIS — E78.5 HYPERLIPIDEMIA, UNSPECIFIED HYPERLIPIDEMIA TYPE: Primary | ICD-10-CM

## 2020-03-02 LAB
ALBUMIN SERPL-MCNC: 4.41 G/DL (ref 3.5–5.2)
ALBUMIN/GLOB SERPL: 1.8 G/DL
ALP SERPL-CCNC: 40 U/L (ref 39–117)
ALT SERPL W P-5'-P-CCNC: 25 U/L (ref 1–41)
ANION GAP SERPL CALCULATED.3IONS-SCNC: 12.2 MMOL/L (ref 5–15)
AST SERPL-CCNC: 29 U/L (ref 1–40)
BASOPHILS # BLD AUTO: 0.04 10*3/MM3 (ref 0–0.2)
BASOPHILS NFR BLD AUTO: 0.5 % (ref 0–1.5)
BILIRUB SERPL-MCNC: 0.4 MG/DL (ref 0.2–1.2)
BUN BLD-MCNC: 25 MG/DL (ref 6–20)
BUN/CREAT SERPL: 26.3 (ref 7–25)
CALCIUM SPEC-SCNC: 9.2 MG/DL (ref 8.6–10.5)
CHLORIDE SERPL-SCNC: 102 MMOL/L (ref 98–107)
CHOLEST SERPL-MCNC: 170 MG/DL (ref 0–200)
CO2 SERPL-SCNC: 27.8 MMOL/L (ref 22–29)
CREAT BLD-MCNC: 0.95 MG/DL (ref 0.76–1.27)
DEPRECATED RDW RBC AUTO: 42.9 FL (ref 37–54)
EOSINOPHIL # BLD AUTO: 0.32 10*3/MM3 (ref 0–0.4)
EOSINOPHIL NFR BLD AUTO: 3.9 % (ref 0.3–6.2)
ERYTHROCYTE [DISTWIDTH] IN BLOOD BY AUTOMATED COUNT: 12.8 % (ref 12.3–15.4)
GFR SERPL CREATININE-BSD FRML MDRD: 82 ML/MIN/1.73
GLOBULIN UR ELPH-MCNC: 2.5 GM/DL
GLUCOSE BLD-MCNC: 101 MG/DL (ref 65–99)
HCT VFR BLD AUTO: 39.3 % (ref 37.5–51)
HDLC SERPL-MCNC: 58 MG/DL (ref 40–60)
HGB BLD-MCNC: 13.4 G/DL (ref 13–17.7)
IMM GRANULOCYTES # BLD AUTO: 0.03 10*3/MM3 (ref 0–0.05)
IMM GRANULOCYTES NFR BLD AUTO: 0.4 % (ref 0–0.5)
LDLC SERPL CALC-MCNC: 86 MG/DL (ref 0–100)
LDLC/HDLC SERPL: 1.48 {RATIO}
LYMPHOCYTES # BLD AUTO: 2.1 10*3/MM3 (ref 0.7–3.1)
LYMPHOCYTES NFR BLD AUTO: 25.5 % (ref 19.6–45.3)
MCH RBC QN AUTO: 31.5 PG (ref 26.6–33)
MCHC RBC AUTO-ENTMCNC: 34.1 G/DL (ref 31.5–35.7)
MCV RBC AUTO: 92.3 FL (ref 79–97)
MONOCYTES # BLD AUTO: 0.79 10*3/MM3 (ref 0.1–0.9)
MONOCYTES NFR BLD AUTO: 9.6 % (ref 5–12)
NEUTROPHILS # BLD AUTO: 4.96 10*3/MM3 (ref 1.7–7)
NEUTROPHILS NFR BLD AUTO: 60.1 % (ref 42.7–76)
NRBC BLD AUTO-RTO: 0 /100 WBC (ref 0–0.2)
PLATELET # BLD AUTO: 189 10*3/MM3 (ref 140–450)
PMV BLD AUTO: 12.1 FL (ref 6–12)
POTASSIUM BLD-SCNC: 4.1 MMOL/L (ref 3.5–5.2)
PROT SERPL-MCNC: 6.9 G/DL (ref 6–8.5)
RBC # BLD AUTO: 4.26 10*6/MM3 (ref 4.14–5.8)
SODIUM BLD-SCNC: 142 MMOL/L (ref 136–145)
TRIGL SERPL-MCNC: 131 MG/DL (ref 0–150)
TSH SERPL DL<=0.05 MIU/L-ACNC: 3.23 UIU/ML (ref 0.27–4.2)
VLDLC SERPL-MCNC: 26.2 MG/DL
WBC NRBC COR # BLD: 8.24 10*3/MM3 (ref 3.4–10.8)

## 2020-03-02 PROCEDURE — 80053 COMPREHEN METABOLIC PANEL: CPT

## 2020-03-02 PROCEDURE — 84443 ASSAY THYROID STIM HORMONE: CPT

## 2020-03-02 PROCEDURE — 85025 COMPLETE CBC W/AUTO DIFF WBC: CPT

## 2020-03-02 PROCEDURE — 80061 LIPID PANEL: CPT

## 2020-03-02 PROCEDURE — 36415 COLL VENOUS BLD VENIPUNCTURE: CPT

## 2020-04-06 ENCOUNTER — TELEPHONE (OUTPATIENT)
Dept: UROLOGY | Facility: CLINIC | Age: 55
End: 2020-04-06

## 2020-04-06 NOTE — TELEPHONE ENCOUNTER
Called patient to reschedule patient out one month. He has requested a refill on Toradol in case he needs it. He said he is also out of flomax, but wasn't sure if he still needed it.

## 2020-05-08 ENCOUNTER — OFFICE VISIT (OUTPATIENT)
Dept: UROLOGY | Facility: CLINIC | Age: 55
End: 2020-05-08

## 2020-05-08 VITALS — TEMPERATURE: 97.8 F | HEIGHT: 69 IN | WEIGHT: 145 LBS | BODY MASS INDEX: 21.48 KG/M2

## 2020-05-08 DIAGNOSIS — N20.1 RIGHT URETERAL CALCULUS: Primary | ICD-10-CM

## 2020-05-08 PROCEDURE — 99213 OFFICE O/P EST LOW 20 MIN: CPT | Performed by: UROLOGY

## 2020-05-08 NOTE — PROGRESS NOTES
Chief Complaint:          Chief Complaint   Patient presents with   • Nephrolithiasis     follow up        HPI:   54 y.o. male well-known to me with a history of an elevated PSA after I saw him last time he dropped a stone because of significant pain it completely resolved he has had no pain after the 6 hours after the appointment but it was severe I am to check a KUB he does not think he passed that he has had no complaints problems he is an active hiker I will notify him of the results at [phone] when least report is available      Past Medical History:        Past Medical History:   Diagnosis Date   • Benign prostatic hyperplasia April 2018   • Elevated PSA July 2019   • Hyperlipidemia    • Hypertension    • Kidney stone 2004   • Polycystic kidney disease April 2018   • Renal cyst          Current Meds:     Current Outpatient Medications   Medication Sig Dispense Refill   • lisinopril (PRINIVIL,ZESTRIL) 20 MG tablet Take 1 tablet by mouth Daily. 90 tablet 3   • meloxicam (MOBIC) 15 MG tablet Take 1 tablet by mouth Daily. 90 tablet 3   • ondansetron (ZOFRAN) 4 MG tablet Take 1 tablet by mouth Every 6 (Six) Hours As Needed for Nausea or Vomiting. 15 tablet 0   • oxyCODONE-acetaminophen (PERCOCET) 5-325 MG per tablet Take 1 tablet by mouth Every 4 (Four) Hours As Needed for Severe Pain . 8 tablet 0   • potassium citrate (UROCIT-K) 10 MEQ (1080 MG) CR tablet Take 1 tablet by mouth 2 (Two) Times a Day. 180 tablet 3   • propranolol (INDERAL) 40 MG tablet Take 1 tablet by mouth Daily. (Patient taking differently: Take 40 mg by mouth As Needed.) 30 tablet 3   • rosuvastatin (CRESTOR) 40 MG tablet Take 1 tablet by mouth Daily. 30 tablet 3   • tamsulosin (FLOMAX) 0.4 MG capsule 24 hr capsule Take 1 capsule by mouth Every Night. 14 capsule 0     No current facility-administered medications for this visit.         Allergies:      No Known Allergies     Past Surgical History:     Past Surgical History:   Procedure Laterality  Date   • JOINT REPLACEMENT     • LITHOTRIPSY     • VASECTOMY  2003         Social History:     Social History     Socioeconomic History   • Marital status:      Spouse name: Not on file   • Number of children: Not on file   • Years of education: Not on file   • Highest education level: Not on file   Tobacco Use   • Smoking status: Never Smoker   Substance and Sexual Activity   • Alcohol use: No   • Drug use: No   • Sexual activity: Yes     Partners: Female     Birth control/protection: Surgical       Family History:     Family History   Problem Relation Age of Onset   • Cancer Father         Bladder ca   • Prostate cancer Father    • Hypertension Mother        Review of Systems:     Review of Systems   Constitutional: Negative.    HENT: Negative.    Eyes: Negative.    Respiratory: Negative.    Cardiovascular: Negative.    Gastrointestinal: Negative.    Endocrine: Negative.    Musculoskeletal: Negative.    Allergic/Immunologic: Negative.    Neurological: Negative.    Hematological: Negative.    Psychiatric/Behavioral: Negative.        Physical Exam:     Physical Exam   Constitutional: He is oriented to person, place, and time. He appears well-developed and well-nourished.   HENT:   Head: Normocephalic and atraumatic.   Eyes: Pupils are equal, round, and reactive to light. Conjunctivae and EOM are normal.   Neck: Normal range of motion.   Cardiovascular: Normal rate, regular rhythm, normal heart sounds and intact distal pulses.   Pulmonary/Chest: Effort normal and breath sounds normal.   Abdominal: Soft. Bowel sounds are normal.   Musculoskeletal: Normal range of motion.   Neurological: He is alert and oriented to person, place, and time. He has normal reflexes.   Skin: Skin is warm and dry.   Psychiatric: He has a normal mood and affect. His behavior is normal. Judgment and thought content normal.   Nursing note and vitals reviewed.      I have reviewed the following portions of the patient's history:  allergies, current medications, past family history, past medical history, past social history, past surgical history, problem list and ROS and confirm it's accurate.      Procedure:       Assessment/Plan:   Ureteral calculus-patient has been diagnosed with a ureteral calculus.  We have discussed the various parameters regarding spontaneous passage including the notion that a 2 mm stone has a high likelihood of spontaneous passage versus a larger stone being caught up in the upper areas of the urinary tract.  We also discussed the medical management of stone disease and the use of medical expulsive therapy in the form of Flomax.  This is used in an off label setting.  We discussed the indicators for intervention including  absolute indicators such as sepsis and uncontrollable severe pain as well as  the relative indicators of moderate pain that is well-controlled with various analgesia.  I also talked about nonoperative management including ambulation and increasing fluids and hot tub as being an effective adjuncts in the treatment of a ureteral stone.  KUB is pending            Patient's Body mass index is 21.41 kg/m². BMI is within normal parameters. No follow-up required..              This document has been electronically signed by JOSE M IBANEZ MD May 8, 2020 15:56

## 2020-05-11 ENCOUNTER — TRANSCRIBE ORDERS (OUTPATIENT)
Dept: ADMINISTRATIVE | Facility: HOSPITAL | Age: 55
End: 2020-05-11

## 2020-05-11 ENCOUNTER — HOSPITAL ENCOUNTER (OUTPATIENT)
Dept: GENERAL RADIOLOGY | Facility: HOSPITAL | Age: 55
Discharge: HOME | End: 2020-05-11
Admitting: EMERGENCY MEDICINE

## 2020-05-11 DIAGNOSIS — R10.9 ABDOMINAL PAIN, UNSPECIFIED ABDOMINAL LOCATION: Primary | ICD-10-CM

## 2020-05-11 PROCEDURE — 74018 RADEX ABDOMEN 1 VIEW: CPT

## 2020-06-07 ENCOUNTER — HOSPITAL ENCOUNTER (EMERGENCY)
Facility: HOSPITAL | Age: 55
Discharge: HOME | End: 2020-06-07
Attending: EMERGENCY MEDICINE | Admitting: EMERGENCY MEDICINE

## 2020-06-07 ENCOUNTER — APPOINTMENT (OUTPATIENT)
Dept: GENERAL RADIOLOGY | Facility: HOSPITAL | Age: 55
End: 2020-06-07

## 2020-06-07 VITALS
SYSTOLIC BLOOD PRESSURE: 154 MMHG | HEIGHT: 69 IN | WEIGHT: 150 LBS | BODY MASS INDEX: 22.22 KG/M2 | OXYGEN SATURATION: 98 % | TEMPERATURE: 98.4 F | HEART RATE: 78 BPM | DIASTOLIC BLOOD PRESSURE: 83 MMHG | RESPIRATION RATE: 18 BRPM

## 2020-06-07 DIAGNOSIS — S86.912A STRAIN OF LEFT KNEE, INITIAL ENCOUNTER: Primary | ICD-10-CM

## 2020-06-07 DIAGNOSIS — M25.462 EFFUSION OF LEFT KNEE: ICD-10-CM

## 2020-06-07 PROCEDURE — 25010000002 HYDROMORPHONE 1 MG/ML SOLUTION: Performed by: NURSE PRACTITIONER

## 2020-06-07 PROCEDURE — 25010000002 ONDANSETRON PER 1 MG: Performed by: NURSE PRACTITIONER

## 2020-06-07 PROCEDURE — 96374 THER/PROPH/DIAG INJ IV PUSH: CPT

## 2020-06-07 PROCEDURE — 99284 EMERGENCY DEPT VISIT MOD MDM: CPT

## 2020-06-07 PROCEDURE — 96372 THER/PROPH/DIAG INJ SC/IM: CPT

## 2020-06-07 PROCEDURE — 73562 X-RAY EXAM OF KNEE 3: CPT

## 2020-06-07 PROCEDURE — 25010000002 DEXAMETHASONE PER 1 MG: Performed by: NURSE PRACTITIONER

## 2020-06-07 PROCEDURE — 96375 TX/PRO/DX INJ NEW DRUG ADDON: CPT

## 2020-06-07 RX ORDER — LIDOCAINE HYDROCHLORIDE 10 MG/ML
5 INJECTION, SOLUTION EPIDURAL; INFILTRATION; INTRACAUDAL; PERINEURAL ONCE
Status: COMPLETED | OUTPATIENT
Start: 2020-06-07 | End: 2020-06-07

## 2020-06-07 RX ORDER — BUPIVACAINE HYDROCHLORIDE 5 MG/ML
5 INJECTION, SOLUTION PERINEURAL ONCE
Status: COMPLETED | OUTPATIENT
Start: 2020-06-07 | End: 2020-06-07

## 2020-06-07 RX ORDER — ONDANSETRON 2 MG/ML
4 INJECTION INTRAMUSCULAR; INTRAVENOUS ONCE
Status: COMPLETED | OUTPATIENT
Start: 2020-06-07 | End: 2020-06-07

## 2020-06-07 RX ORDER — SODIUM CHLORIDE 0.9 % (FLUSH) 0.9 %
10 SYRINGE (ML) INJECTION AS NEEDED
Status: DISCONTINUED | OUTPATIENT
Start: 2020-06-07 | End: 2020-06-07 | Stop reason: HOSPADM

## 2020-06-07 RX ORDER — DEXAMETHASONE SODIUM PHOSPHATE 4 MG/ML
4 INJECTION, SOLUTION INTRA-ARTICULAR; INTRALESIONAL; INTRAMUSCULAR; INTRAVENOUS; SOFT TISSUE ONCE
Status: COMPLETED | OUTPATIENT
Start: 2020-06-07 | End: 2020-06-07

## 2020-06-07 RX ORDER — IBUPROFEN 800 MG/1
800 TABLET ORAL EVERY 6 HOURS PRN
Qty: 25 TABLET | Refills: 0 | Status: SHIPPED | OUTPATIENT
Start: 2020-06-07 | End: 2022-08-04

## 2020-06-07 RX ADMIN — BUPIVACAINE HYDROCHLORIDE 5 ML: 5 INJECTION, SOLUTION PERINEURAL at 20:34

## 2020-06-07 RX ADMIN — DEXAMETHASONE SODIUM PHOSPHATE 4 MG: 4 INJECTION, SOLUTION INTRAMUSCULAR; INTRAVENOUS at 20:34

## 2020-06-07 RX ADMIN — SODIUM CHLORIDE 500 ML: 9 INJECTION, SOLUTION INTRAVENOUS at 20:09

## 2020-06-07 RX ADMIN — ONDANSETRON 4 MG: 2 INJECTION INTRAMUSCULAR; INTRAVENOUS at 20:10

## 2020-06-07 RX ADMIN — LIDOCAINE HYDROCHLORIDE 5 ML: 10 INJECTION, SOLUTION EPIDURAL; INFILTRATION; INTRACAUDAL; PERINEURAL at 20:34

## 2020-06-07 RX ADMIN — HYDROMORPHONE HYDROCHLORIDE 1 MG: 1 INJECTION, SOLUTION INTRAMUSCULAR; INTRAVENOUS; SUBCUTANEOUS at 20:10

## 2020-06-08 ENCOUNTER — TRANSCRIBE ORDERS (OUTPATIENT)
Dept: ADMINISTRATIVE | Facility: HOSPITAL | Age: 55
End: 2020-06-08

## 2020-06-08 ENCOUNTER — EPISODE CHANGES (OUTPATIENT)
Dept: CASE MANAGEMENT | Facility: OTHER | Age: 55
End: 2020-06-08

## 2020-06-08 DIAGNOSIS — M25.562 ACUTE PAIN OF LEFT KNEE: Primary | ICD-10-CM

## 2020-06-08 NOTE — ED NOTES
Dr. Cadena aspirated left knee at this time, approximately 8-10 ML of aspirate noted. Appears to be serosanguinous.       Bakari Piage RN  06/07/20 7086

## 2020-06-08 NOTE — ED PROVIDER NOTES
Subjective     Knee Pain   Location:  Knee  Injury: yes    Mechanism of injury: fall    Fall:     Fall occurred:  Recreating/playing (Patient curled his left ankle a few weeks ago and has intermittently had issues with swelling of his left knee. )  Knee location:  L knee  Pain details:     Quality:  Throbbing and aching    Radiates to:  L leg    Severity:  Severe    Onset quality:  Sudden    Timing:  Intermittent    Progression:  Waxing and waning  Chronicity:  New  Dislocation: no    Prior injury to area:  No  Relieved by:  None tried  Worsened by:  Nothing  Ineffective treatments:  None tried  Associated symptoms: decreased ROM and swelling    Associated symptoms: no fever        Review of Systems   Constitutional: Negative.  Negative for fever.   HENT: Negative.    Respiratory: Negative.    Cardiovascular: Negative.  Negative for chest pain.   Gastrointestinal: Negative.  Negative for abdominal pain.   Endocrine: Negative.    Genitourinary: Negative.  Negative for dysuria.   Skin: Negative.    Neurological: Negative.    Psychiatric/Behavioral: Negative.    All other systems reviewed and are negative.      Past Medical History:   Diagnosis Date   • Benign prostatic hyperplasia April 2018   • Elevated PSA July 2019   • Hyperlipidemia    • Hypertension    • Kidney stone 2004   • Polycystic kidney disease April 2018   • Renal cyst        No Known Allergies    Past Surgical History:   Procedure Laterality Date   • JOINT REPLACEMENT     • LITHOTRIPSY     • VASECTOMY  2003       Family History   Problem Relation Age of Onset   • Cancer Father         Bladder ca   • Prostate cancer Father    • Hypertension Mother        Social History     Socioeconomic History   • Marital status:      Spouse name: Not on file   • Number of children: Not on file   • Years of education: Not on file   • Highest education level: Not on file   Tobacco Use   • Smoking status: Never Smoker   • Smokeless tobacco: Never Used   Substance  and Sexual Activity   • Alcohol use: No   • Drug use: No   • Sexual activity: Yes     Partners: Female     Birth control/protection: Surgical           Objective   Physical Exam   Constitutional: He is oriented to person, place, and time. He appears well-developed and well-nourished. No distress.   HENT:   Head: Normocephalic and atraumatic.   Right Ear: External ear normal.   Left Ear: External ear normal.   Nose: Nose normal.   Eyes: Pupils are equal, round, and reactive to light. Conjunctivae and EOM are normal.   Neck: Normal range of motion. Neck supple. No JVD present. No tracheal deviation present.   Cardiovascular: Normal rate, regular rhythm and normal heart sounds.   No murmur heard.  Pulmonary/Chest: Effort normal and breath sounds normal. No respiratory distress. He has no wheezes.   Abdominal: Soft. Bowel sounds are normal. There is no tenderness.   Musculoskeletal: He exhibits no edema or deformity.        Left knee: He exhibits decreased range of motion and swelling. Tenderness found.   Neurological: He is alert and oriented to person, place, and time. No cranial nerve deficit.   Skin: Skin is warm and dry. No rash noted. He is not diaphoretic. No erythema. No pallor.   Psychiatric: He has a normal mood and affect. His behavior is normal. Thought content normal.   Nursing note and vitals reviewed.      Procedures           ED Course  ED Course as of Jun 07 2331   Sun Jun 07, 2020 2030 Dr. Cadena aspirated around 15ml of sanguineous fluid from the patients knee without difficultly.  Patient tolerated procedure well.      [ANDREINA]   2139 Patient has pain medication left from his last kidney stone, he does not want another prescription at this time.     [ANDREINA]      ED Course User Index  [ANDREINA] Zoraida Rodríguez, APRN                                           MDM  Number of Diagnoses or Management Options  Effusion of left knee:   Strain of left knee, initial encounter:      Amount and/or Complexity of Data  Reviewed  Tests in the radiology section of CPT®: reviewed        Final diagnoses:   Strain of left knee, initial encounter   Effusion of left knee            Zoraida Rodríguez, APRN  06/07/20 6573

## 2020-06-09 ENCOUNTER — HOSPITAL ENCOUNTER (OUTPATIENT)
Dept: MRI IMAGING | Facility: HOSPITAL | Age: 55
Discharge: HOME | End: 2020-06-09
Admitting: PHYSICIAN ASSISTANT

## 2020-06-09 DIAGNOSIS — M25.562 ACUTE PAIN OF LEFT KNEE: ICD-10-CM

## 2020-06-09 PROCEDURE — 73721 MRI JNT OF LWR EXTRE W/O DYE: CPT | Performed by: RADIOLOGY

## 2020-06-09 PROCEDURE — 73721 MRI JNT OF LWR EXTRE W/O DYE: CPT

## 2020-06-11 NOTE — ED PROVIDER NOTES
Subjective   History of Present Illness    Review of Systems    Past Medical History:   Diagnosis Date   • Benign prostatic hyperplasia April 2018   • Elevated PSA July 2019   • Hyperlipidemia    • Hypertension    • Kidney stone 2004   • Polycystic kidney disease April 2018   • Renal cyst        No Known Allergies    Past Surgical History:   Procedure Laterality Date   • JOINT REPLACEMENT     • LITHOTRIPSY     • VASECTOMY  2003       Family History   Problem Relation Age of Onset   • Cancer Father         Bladder ca   • Prostate cancer Father    • Hypertension Mother        Social History     Socioeconomic History   • Marital status:      Spouse name: Not on file   • Number of children: Not on file   • Years of education: Not on file   • Highest education level: Not on file   Tobacco Use   • Smoking status: Never Smoker   • Smokeless tobacco: Never Used   Substance and Sexual Activity   • Alcohol use: No   • Drug use: No   • Sexual activity: Yes     Partners: Female     Birth control/protection: Surgical           Objective   Physical Exam    Joint Aspiration/Injection  Date/Time: 6/11/2020 1:17 PM  Performed by: Edgar Cadena MD  Authorized by: Edgar Cadena MD     Consent:     Consent obtained:  Verbal and written    Consent given by:  Patient    Risks discussed:  Bleeding, incomplete drainage, infection, nerve damage and pain    Alternatives discussed:  No treatment, delayed treatment, alternative treatment and referral  Location:     Location:  Knee    Knee:  L knee  Anesthesia (see MAR for exact dosages):     Anesthesia method:  Local infiltration    Local anesthetic:  Lidocaine 1% w/o epi  Procedure details:     Needle gauge:  22 G    Ultrasound guidance: no      Approach:  Lateral    Aspirate characteristics:  Bloody    Steroid injected: yes      Specimen collected: no    Post-procedure details:     Dressing:  Adhesive bandage    Patient tolerance of procedure:  Tolerated well, no immediate  complications               ED Course  ED Course as of Jun 11 1318   Sun Jun 07, 2020 2030 Dr. Cadena aspirated around 15ml of sanguineous fluid from the patients knee without difficultly.  Patient tolerated procedure well.      [ANDREINA]   2139 Patient has pain medication left from his last kidney stone, he does not want another prescription at this time.     [ANDREINA]      ED Course User Index  [ANDREINA] Zoraida Rodríguez, APRN                                           [facility]    Final diagnoses:   Strain of left knee, initial encounter   Effusion of left knee            Edgar Cadena MD  06/11/20 1460

## 2020-12-19 ENCOUNTER — IMMUNIZATION (OUTPATIENT)
Dept: VACCINE CLINIC | Facility: HOSPITAL | Age: 55
End: 2020-12-19

## 2020-12-19 PROCEDURE — 91300 HC SARSCOV02 VAC 30MCG/0.3ML IM: CPT | Performed by: FAMILY MEDICINE

## 2020-12-19 PROCEDURE — 0001A: CPT | Performed by: FAMILY MEDICINE

## 2021-01-09 ENCOUNTER — IMMUNIZATION (OUTPATIENT)
Dept: VACCINE CLINIC | Facility: HOSPITAL | Age: 56
End: 2021-01-09

## 2021-01-09 PROCEDURE — 91300 HC SARSCOV02 VAC 30MCG/0.3ML IM: CPT | Performed by: FAMILY MEDICINE

## 2021-01-09 PROCEDURE — 0002A: CPT | Performed by: FAMILY MEDICINE

## 2021-01-09 PROCEDURE — 0001A: CPT | Performed by: FAMILY MEDICINE

## 2021-02-12 ENCOUNTER — TRANSCRIBE ORDERS (OUTPATIENT)
Dept: ADMINISTRATIVE | Facility: HOSPITAL | Age: 56
End: 2021-02-12

## 2021-02-12 DIAGNOSIS — H90.A22 SENSORINEURAL HEARING LOSS, UNILATERAL, LEFT EAR, WITH RESTRICTED HEARING ON THE CONTRALATERAL SIDE: Primary | ICD-10-CM

## 2021-02-18 ENCOUNTER — HOSPITAL ENCOUNTER (OUTPATIENT)
Dept: MRI IMAGING | Facility: HOSPITAL | Age: 56
Discharge: HOME | End: 2021-02-18
Admitting: FAMILY MEDICINE

## 2021-02-18 DIAGNOSIS — H90.A22 SENSORINEURAL HEARING LOSS, UNILATERAL, LEFT EAR, WITH RESTRICTED HEARING ON THE CONTRALATERAL SIDE: ICD-10-CM

## 2021-02-18 PROCEDURE — 70551 MRI BRAIN STEM W/O DYE: CPT | Performed by: RADIOLOGY

## 2021-02-18 PROCEDURE — 70551 MRI BRAIN STEM W/O DYE: CPT

## 2021-02-19 ENCOUNTER — OFFICE VISIT (OUTPATIENT)
Dept: UROLOGY | Facility: CLINIC | Age: 56
End: 2021-02-19

## 2021-02-19 VITALS — TEMPERATURE: 97.6 F | BODY MASS INDEX: 22.36 KG/M2 | HEIGHT: 69 IN | WEIGHT: 151 LBS

## 2021-02-19 DIAGNOSIS — R97.20 ELEVATED PROSTATE SPECIFIC ANTIGEN (PSA): Primary | ICD-10-CM

## 2021-02-19 PROCEDURE — 99213 OFFICE O/P EST LOW 20 MIN: CPT | Performed by: UROLOGY

## 2021-02-19 PROCEDURE — 84153 ASSAY OF PSA TOTAL: CPT | Performed by: UROLOGY

## 2021-02-19 RX ORDER — TAMSULOSIN HYDROCHLORIDE 0.4 MG/1
1 CAPSULE ORAL NIGHTLY
Qty: 30 CAPSULE | Refills: 5 | Status: SHIPPED | OUTPATIENT
Start: 2021-02-19 | End: 2022-08-04

## 2021-02-19 RX ORDER — KETOROLAC TROMETHAMINE 10 MG/1
10 TABLET, FILM COATED ORAL EVERY 6 HOURS PRN
Qty: 16 TABLET | Refills: 2 | Status: SHIPPED | OUTPATIENT
Start: 2021-02-19 | End: 2022-08-04

## 2021-02-19 NOTE — PROGRESS NOTES
Chief Complaint:          Chief Complaint   Patient presents with   • Nephrolithiasis     follow up        HPI:   55 y.o. male doing well having no complaints problems burning blood discharge fevers chills etc.  Last year he dropped a stone right after we saw him he is currently stone free staying in fantastic shape with no voiding dysfunction.  PSA is currently pending      Past Medical History:        Past Medical History:   Diagnosis Date   • Benign prostatic hyperplasia April 2018   • Elevated PSA July 2019   • Hyperlipidemia    • Hypertension    • Kidney stone 2004   • Polycystic kidney disease April 2018   • Renal cyst          Current Meds:     Current Outpatient Medications   Medication Sig Dispense Refill   • ELIQUIS 2.5 MG tablet tablet Take 1 tablet by mouth 2 (two) times a day. 30 tablet 0   • ibuprofen (ADVIL,MOTRIN) 800 MG tablet Take 1 tablet by mouth Every 6 (Six) Hours As Needed for Moderate Pain . 25 tablet 0   • lidocaine (LIDODERM) 5 % Apply to knee at bedtime and remove in the morning 30 patch 0   • lisinopril (PRINIVIL,ZESTRIL) 20 MG tablet Take 1 tablet by mouth Daily. 90 tablet 3   • lisinopril-hydrochlorothiazide (PRINZIDE,ZESTORETIC) 20-12.5 MG per tablet Take 1 tablet by mouth Daily. 90 tablet 3   • meloxicam (MOBIC) 15 MG tablet Take 1 tablet by mouth Daily. 90 tablet 3   • ondansetron (ZOFRAN) 4 MG tablet Take 1 tablet by mouth Every 6 (Six) Hours As Needed for Nausea or Vomiting. 15 tablet 0   • potassium citrate (UROCIT-K) 10 MEQ (1080 MG) CR tablet Take 1 tablet by mouth 2 (Two) Times a Day. 180 tablet 3   • propranolol (INDERAL) 40 MG tablet Take 1 tablet by mouth Daily. (Patient taking differently: Take 40 mg by mouth As Needed.) 30 tablet 3   • rosuvastatin (CRESTOR) 40 MG tablet Take 1 tablet(s) by mouth daily 90 tablet 0   • tamsulosin (FLOMAX) 0.4 MG capsule 24 hr capsule Take 1 capsule by mouth Every Night. 14 capsule 0   • valACYclovir (VALTREX) 1000 MG tablet Take 2 tablets  by mouth Every 12 (Twelve) Hours for 2 doses as needed. 10 tablet 1     No current facility-administered medications for this visit.         Allergies:      No Known Allergies     Past Surgical History:     Past Surgical History:   Procedure Laterality Date   • JOINT REPLACEMENT     • LITHOTRIPSY     • VASECTOMY  2003         Social History:     Social History     Socioeconomic History   • Marital status:      Spouse name: Not on file   • Number of children: Not on file   • Years of education: Not on file   • Highest education level: Not on file   Tobacco Use   • Smoking status: Never Smoker   • Smokeless tobacco: Never Used   Substance and Sexual Activity   • Alcohol use: No   • Drug use: No   • Sexual activity: Yes     Partners: Female     Birth control/protection: Surgical       Family History:     Family History   Problem Relation Age of Onset   • Cancer Father         Bladder ca   • Prostate cancer Father    • Hypertension Mother        Review of Systems:     Review of Systems   Constitutional: Negative.    HENT: Negative.    Eyes: Negative.    Respiratory: Negative.    Cardiovascular: Negative.    Gastrointestinal: Negative.    Endocrine: Negative.    Musculoskeletal: Negative.    Allergic/Immunologic: Negative.    Neurological: Negative.    Hematological: Negative.    Psychiatric/Behavioral: Negative.        Physical Exam:     Physical Exam  Vitals signs and nursing note reviewed.   Constitutional:       Appearance: He is well-developed.   HENT:      Head: Normocephalic and atraumatic.   Eyes:      Conjunctiva/sclera: Conjunctivae normal.      Pupils: Pupils are equal, round, and reactive to light.   Neck:      Musculoskeletal: Normal range of motion.   Cardiovascular:      Rate and Rhythm: Normal rate and regular rhythm.      Heart sounds: Normal heart sounds.   Pulmonary:      Effort: Pulmonary effort is normal.      Breath sounds: Normal breath sounds.   Abdominal:      General: Bowel sounds are  normal.      Palpations: Abdomen is soft.   Genitourinary:     Penis: Normal.       Scrotum/Testes: Normal.      Prostate: Normal.      Rectum: Normal.   Musculoskeletal: Normal range of motion.   Skin:     General: Skin is warm and dry.   Neurological:      Mental Status: He is alert and oriented to person, place, and time.      Deep Tendon Reflexes: Reflexes are normal and symmetric.   Psychiatric:         Behavior: Behavior normal.         Thought Content: Thought content normal.         Judgment: Judgment normal.         I have reviewed the following portions of the patient's history: allergies, current medications, past family history, past medical history, past social history, past surgical history, problem list and ROS and confirm it's accurate.      Procedure:       Assessment/Plan:   Elevated prostate specific antigen-we discussed the diagnosis of elevated prostate-specific antigen.  I explained the pathophysiology of PSA.  It is a serine protease that's function in the male reproductive tract is to facilitate the liquefaction of semen.  It is for this reason the body does not want it freely floating in the serum and why typically bound tightly to albumin.  We discussed why we used both a PSA free and total to determine the need for more aggressive therapy I discussed the normal range.  Additionally it was in the range of 1-4 but more recently has been downgraded to something less than 2 or even approaching 1.  I discussed the risk of family history particularly the fact that the average male has a 14% risk of prostate cancer and that in the face of a positive diagnosis in a father it will tablet and any other first-generation relative continued tablet insofar that a father and brother with prostate cancer will produce almost a 50% risk of prostate cancer.  I discussed the use of the temporal use of PSA as the best option for monitoring.  We also discussed the fact that an elevated PSA is an isolated event  does not mean that this is prostate cancer and should not engender worry in this regard. I discussed other things that can elevate PSA including constipation, prostatitis, infection, recent intercourse etc. as well as the risks and benefits associated with this.  Also discussed the fact that this is with a dilutional test and as a consequence of such were present produce slight variations on a single specimen.  Further discussed the risks and benefits of a prostate biopsy as well.  Ureteral calculus-patient has been diagnosed with a ureteral calculus.  We have discussed the various parameters regarding spontaneous passage including the notion that a 2 mm stone has a high likelihood of spontaneous passage versus a larger stone being caught up in the upper areas of the urinary tract.  We also discussed the medical management of stone disease and the use of medical expulsive therapy in the form of Flomax.  This is used in an off label setting.  We discussed the indicators for intervention including  absolute indicators such as sepsis and uncontrollable severe pain as well as  the relative indicators of moderate pain that is well-controlled with various analgesia.  I also talked about nonoperative management including ambulation and increasing fluids and hot tub as being an effective adjuncts in the treatment of a ureteral stone.  Given Toradol and Flomax to have on hand as needed stone passage            Patient's Body mass index is 22.3 kg/m². BMI is within normal parameters. No follow-up required..              This document has been electronically signed by JOSE M IBANEZ MD February 19, 2021 15:41 EST

## 2021-02-20 LAB — PSA SERPL-MCNC: 5.41 NG/ML (ref 0–4)

## 2021-07-16 ENCOUNTER — LAB (OUTPATIENT)
Dept: LAB | Facility: HOSPITAL | Age: 56
End: 2021-07-16

## 2021-07-16 ENCOUNTER — TRANSCRIBE ORDERS (OUTPATIENT)
Dept: ADMINISTRATIVE | Facility: HOSPITAL | Age: 56
End: 2021-07-16

## 2021-07-16 DIAGNOSIS — N21.8 CALCULUS OF OTHER LOWER URINARY TRACT LOCATION: ICD-10-CM

## 2021-07-16 DIAGNOSIS — I10 ESSENTIAL HYPERTENSION, BENIGN: ICD-10-CM

## 2021-07-16 DIAGNOSIS — E78.5 HYPERLIPIDEMIA, UNSPECIFIED HYPERLIPIDEMIA TYPE: ICD-10-CM

## 2021-07-16 DIAGNOSIS — I10 ESSENTIAL HYPERTENSION, BENIGN: Primary | ICD-10-CM

## 2021-07-16 LAB
ALBUMIN SERPL-MCNC: 4.4 G/DL (ref 3.5–5.2)
ALBUMIN/GLOB SERPL: 1.6 G/DL
ALP SERPL-CCNC: 48 U/L (ref 39–117)
ALT SERPL W P-5'-P-CCNC: 24 U/L (ref 1–41)
ANION GAP SERPL CALCULATED.3IONS-SCNC: 11.5 MMOL/L (ref 5–15)
AST SERPL-CCNC: 27 U/L (ref 1–40)
BASOPHILS # BLD AUTO: 0.05 10*3/MM3 (ref 0–0.2)
BASOPHILS NFR BLD AUTO: 0.7 % (ref 0–1.5)
BILIRUB SERPL-MCNC: 0.3 MG/DL (ref 0–1.2)
BUN SERPL-MCNC: 21 MG/DL (ref 6–20)
BUN/CREAT SERPL: 22.3 (ref 7–25)
CALCIUM SPEC-SCNC: 9.8 MG/DL (ref 8.6–10.5)
CHLORIDE SERPL-SCNC: 102 MMOL/L (ref 98–107)
CHOLEST SERPL-MCNC: 190 MG/DL (ref 0–200)
CO2 SERPL-SCNC: 28.5 MMOL/L (ref 22–29)
CREAT SERPL-MCNC: 0.94 MG/DL (ref 0.76–1.27)
DEPRECATED RDW RBC AUTO: 42.8 FL (ref 37–54)
EOSINOPHIL # BLD AUTO: 0.54 10*3/MM3 (ref 0–0.4)
EOSINOPHIL NFR BLD AUTO: 7.9 % (ref 0.3–6.2)
ERYTHROCYTE [DISTWIDTH] IN BLOOD BY AUTOMATED COUNT: 12.5 % (ref 12.3–15.4)
GFR SERPL CREATININE-BSD FRML MDRD: 83 ML/MIN/1.73
GLOBULIN UR ELPH-MCNC: 2.7 GM/DL
GLUCOSE SERPL-MCNC: 74 MG/DL (ref 65–99)
HCT VFR BLD AUTO: 44.6 % (ref 37.5–51)
HDLC SERPL-MCNC: 49 MG/DL (ref 40–60)
HGB BLD-MCNC: 14.9 G/DL (ref 13–17.7)
IMM GRANULOCYTES # BLD AUTO: 0.02 10*3/MM3 (ref 0–0.05)
IMM GRANULOCYTES NFR BLD AUTO: 0.3 % (ref 0–0.5)
LDLC SERPL CALC-MCNC: 107 MG/DL (ref 0–100)
LDLC/HDLC SERPL: 2.07 {RATIO}
LYMPHOCYTES # BLD AUTO: 2.36 10*3/MM3 (ref 0.7–3.1)
LYMPHOCYTES NFR BLD AUTO: 34.7 % (ref 19.6–45.3)
MCH RBC QN AUTO: 31 PG (ref 26.6–33)
MCHC RBC AUTO-ENTMCNC: 33.4 G/DL (ref 31.5–35.7)
MCV RBC AUTO: 92.7 FL (ref 79–97)
MONOCYTES # BLD AUTO: 0.64 10*3/MM3 (ref 0.1–0.9)
MONOCYTES NFR BLD AUTO: 9.4 % (ref 5–12)
NEUTROPHILS NFR BLD AUTO: 3.2 10*3/MM3 (ref 1.7–7)
NEUTROPHILS NFR BLD AUTO: 47 % (ref 42.7–76)
NRBC BLD AUTO-RTO: 0 /100 WBC (ref 0–0.2)
PLATELET # BLD AUTO: 176 10*3/MM3 (ref 140–450)
PMV BLD AUTO: 12.3 FL (ref 6–12)
POTASSIUM SERPL-SCNC: 3.8 MMOL/L (ref 3.5–5.2)
PROT SERPL-MCNC: 7.1 G/DL (ref 6–8.5)
PSA SERPL-MCNC: 5.75 NG/ML (ref 0–4)
RBC # BLD AUTO: 4.81 10*6/MM3 (ref 4.14–5.8)
SODIUM SERPL-SCNC: 142 MMOL/L (ref 136–145)
TRIGL SERPL-MCNC: 198 MG/DL (ref 0–150)
TSH SERPL DL<=0.05 MIU/L-ACNC: 4.13 UIU/ML (ref 0.27–4.2)
VLDLC SERPL-MCNC: 34 MG/DL (ref 5–40)
WBC # BLD AUTO: 6.81 10*3/MM3 (ref 3.4–10.8)

## 2021-07-16 PROCEDURE — 84443 ASSAY THYROID STIM HORMONE: CPT

## 2021-07-16 PROCEDURE — 80053 COMPREHEN METABOLIC PANEL: CPT

## 2021-07-16 PROCEDURE — 80061 LIPID PANEL: CPT

## 2021-07-16 PROCEDURE — 85025 COMPLETE CBC W/AUTO DIFF WBC: CPT

## 2021-07-16 PROCEDURE — 36415 COLL VENOUS BLD VENIPUNCTURE: CPT

## 2021-07-16 PROCEDURE — G0103 PSA SCREENING: HCPCS

## 2021-10-08 ENCOUNTER — IMMUNIZATION (OUTPATIENT)
Dept: VACCINE CLINIC | Facility: HOSPITAL | Age: 56
End: 2021-10-08

## 2021-10-08 PROCEDURE — 0004A ADM SARSCOV2 30MCG/0.3ML BOOSTER: CPT | Performed by: INTERNAL MEDICINE

## 2021-10-08 PROCEDURE — 91300 HC SARSCOV02 VAC 30MCG/0.3ML IM: CPT | Performed by: INTERNAL MEDICINE

## 2021-10-08 PROCEDURE — 0003A: CPT | Performed by: INTERNAL MEDICINE

## 2022-01-05 ENCOUNTER — LAB (OUTPATIENT)
Dept: LAB | Facility: HOSPITAL | Age: 57
End: 2022-01-05

## 2022-01-05 ENCOUNTER — TRANSCRIBE ORDERS (OUTPATIENT)
Dept: ADMINISTRATIVE | Facility: HOSPITAL | Age: 57
End: 2022-01-05

## 2022-01-05 DIAGNOSIS — R50.9 FEVER, UNSPECIFIED: Primary | ICD-10-CM

## 2022-01-05 LAB
FLUAV RNA RESP QL NAA+PROBE: NOT DETECTED
FLUBV RNA RESP QL NAA+PROBE: NOT DETECTED
SARS-COV-2 RNA RESP QL NAA+PROBE: NOT DETECTED

## 2022-01-05 PROCEDURE — 87636 SARSCOV2 & INF A&B AMP PRB: CPT | Performed by: PHYSICIAN ASSISTANT

## 2022-01-05 PROCEDURE — C9803 HOPD COVID-19 SPEC COLLECT: HCPCS

## 2022-03-03 ENCOUNTER — OFFICE VISIT (OUTPATIENT)
Dept: UROLOGY | Facility: CLINIC | Age: 57
End: 2022-03-03

## 2022-03-03 VITALS — BODY MASS INDEX: 22.82 KG/M2 | HEIGHT: 69 IN | WEIGHT: 154.1 LBS

## 2022-03-03 DIAGNOSIS — R97.20 ELEVATED PROSTATE SPECIFIC ANTIGEN (PSA): Primary | ICD-10-CM

## 2022-03-03 DIAGNOSIS — Z80.42 FAMILY HISTORY OF PROSTATE CANCER IN FATHER: ICD-10-CM

## 2022-03-03 PROCEDURE — 84153 ASSAY OF PSA TOTAL: CPT | Performed by: UROLOGY

## 2022-03-03 PROCEDURE — 99213 OFFICE O/P EST LOW 20 MIN: CPT | Performed by: UROLOGY

## 2022-03-03 RX ORDER — SULFAMETHOXAZOLE AND TRIMETHOPRIM 800; 160 MG/1; MG/1
1 TABLET ORAL 2 TIMES DAILY
Qty: 40 TABLET | Refills: 2 | Status: SHIPPED | OUTPATIENT
Start: 2022-03-03 | End: 2022-08-04

## 2022-03-03 NOTE — PROGRESS NOTES
Chief Complaint:          Chief Complaint   Patient presents with   • Elevated PSA       HPI:   56 y.o. male  doing well having no complaints problems burning blood discharge fevers chills etc.  Last year he dropped a stone right after we saw him he is currently stone free staying in fantastic shape with no voiding dysfunction.  Turns today.  He is doing well.  Sometimes he gets occasional increase in his symptomatology consistent with chronic prostatitis.  If his PSA goes up again I  recommend a course of finasteride.      Past Medical History:        Past Medical History:   Diagnosis Date   • Benign prostatic hyperplasia April 2018   • Elevated PSA July 2019   • Hyperlipidemia    • Hypertension    • Kidney stone 2004   • Polycystic kidney disease April 2018   • Renal cyst          Current Meds:     Current Outpatient Medications   Medication Sig Dispense Refill   • ELIQUIS 2.5 MG tablet tablet Take 1 tablet by mouth 2 (two) times a day. 30 tablet 0   • ibuprofen (ADVIL,MOTRIN) 800 MG tablet Take 1 tablet by mouth Every 6 (Six) Hours As Needed for Moderate Pain . 25 tablet 0   • ketorolac (TORADOL) 10 MG tablet Take 1 tablet by mouth Every 6 (Six) Hours As Needed for Moderate Pain . 16 tablet 2   • lidocaine (LIDODERM) 5 % Apply to knee at bedtime and remove in the morning 30 patch 0   • lisinopril (PRINIVIL,ZESTRIL) 20 MG tablet Take 1 tablet by mouth Daily. 90 tablet 3   • lisinopril-hydrochlorothiazide (PRINZIDE,ZESTORETIC) 20-12.5 MG per tablet Take 1 tablet by mouth Daily. 90 tablet 3   • meloxicam (MOBIC) 15 MG tablet Take 1 tablet by mouth Daily. 90 tablet 3   • meloxicam (MOBIC) 15 MG tablet Take 1 tablet by mouth Daily. 90 tablet 3   • ofloxacin (OCUFLOX) 0.3 % ophthalmic solution Instill 1-2 drops into the affected eye(s) 3 times daily for 7 days. 5 mL 0   • ondansetron (ZOFRAN) 4 MG tablet Take 1 tablet by mouth Every 6 (Six) Hours As Needed for Nausea or Vomiting. 15 tablet 0   • potassium citrate  (UROCIT-K) 10 MEQ (1080 MG) CR tablet Take 1 tablet by mouth 2 (two) times a day. 180 tablet 3   • propranolol (INDERAL) 40 MG tablet Take 1 tablet by mouth Daily. (Patient taking differently: Take 40 mg by mouth As Needed.) 30 tablet 3   • rosuvastatin (CRESTOR) 40 MG tablet Take 1 tablet by mouth Daily. 90 tablet 3   • rosuvastatin (CRESTOR) 40 MG tablet Take 1 tablet by mouth Daily. 90 tablet 3   • tamsulosin (FLOMAX) 0.4 MG capsule 24 hr capsule Take 1 capsule by mouth Every Night. 14 capsule 0   • tamsulosin (Flomax) 0.4 MG capsule 24 hr capsule Take 1 capsule by mouth Every Night. 30 capsule 5   • valACYclovir (VALTREX) 1000 MG tablet Take 2 tablets by mouth Every 12 (Twelve) Hours for 2 doses as needed. 10 tablet 1     No current facility-administered medications for this visit.        Allergies:      No Known Allergies     Past Surgical History:     Past Surgical History:   Procedure Laterality Date   • JOINT REPLACEMENT     • LITHOTRIPSY     • VASECTOMY  2003         Social History:     Social History     Socioeconomic History   • Marital status:    Tobacco Use   • Smoking status: Never Smoker   • Smokeless tobacco: Never Used   Substance and Sexual Activity   • Alcohol use: No   • Drug use: No   • Sexual activity: Yes     Partners: Female     Birth control/protection: Surgical       Family History:     Family History   Problem Relation Age of Onset   • Cancer Father         Bladder ca   • Prostate cancer Father    • Hypertension Mother        Review of Systems:     Review of Systems   Constitutional: Negative.    HENT: Negative.    Eyes: Negative.    Respiratory: Negative.    Cardiovascular: Negative.    Gastrointestinal: Negative.    Endocrine: Negative.    Musculoskeletal: Negative.    Allergic/Immunologic: Negative.    Neurological: Negative.    Hematological: Negative.    Psychiatric/Behavioral: Negative.        Physical Exam:     Physical Exam  Vitals and nursing note reviewed.    Constitutional:       Appearance: He is well-developed.   HENT:      Head: Normocephalic and atraumatic.   Eyes:      Conjunctiva/sclera: Conjunctivae normal.      Pupils: Pupils are equal, round, and reactive to light.   Cardiovascular:      Rate and Rhythm: Normal rate and regular rhythm.      Heart sounds: Normal heart sounds.   Pulmonary:      Effort: Pulmonary effort is normal.      Breath sounds: Normal breath sounds.   Abdominal:      General: Bowel sounds are normal.      Palpations: Abdomen is soft.   Musculoskeletal:         General: Normal range of motion.      Cervical back: Normal range of motion.   Skin:     General: Skin is warm and dry.   Neurological:      Mental Status: He is alert and oriented to person, place, and time.      Deep Tendon Reflexes: Reflexes are normal and symmetric.   Psychiatric:         Behavior: Behavior normal.         Thought Content: Thought content normal.         Judgment: Judgment normal.         I have reviewed the following portions of the patient's history: Allergies, current medications, past family history, past medical history, past social history, past surgical history, problem list, and ROS and confirm it is accurate.      Procedure:       Assessment/Plan:   Prostatitis-we discussed the differential diagnosis of prostatitis including the National Institutes of Health classification system I through IV.  I discussed that type I prostatitis is acute bacterial prostatitis and associated with high fevers, typically hematuria, and severe pain with voiding.  We discussed the fact that it necessitates 6 weeks of chronic antibiotics to be sure it doesn't turn into a chronic bacterial prostatitis that can have lifelong ramifications.  We discussed National Institutes of Health type II chronic bacterial prostatitis.  We discussed the fact that this a chronic bacterial infection of the prostate that often requires suppressive antibiotics.  We discussed type III being  related more to nonspecific urethritis, as well as tight for being related to finding inflammation and a biopsy in the absence of symptomatology.  I discussed the diagnostic strategies including the VB 1 through 4 urine culture and discussed empiric therapy.  I emphasized that with the use of chronic antibiotics, I strongly recommended the concomitant use of probiotics.  Additionally, we discussed the various antibiotics used and the risks and benefits associated with each, particularly the use of long-term ciprofloxacin and the effect on joints and collagen in human beings.  He has occasional symptomatology consistent with a flareup of his known chronic prostatitis I gave him additional Septra to have on hand should he have a flare.  BPH: Discussed the pathophysiology of BPH and obstruction.  We discussed the static and dynamic effects of BPH as well as using 5 alpha reductase inhibitors versus alpha blockade.  We discussed the indications for transurethral surgery as well and/ or other therapeutic options available including all of the newer techniques.  Continues on alpha blockade  Elevated prostate specific antigen-we discussed the diagnosis of elevated prostate-specific antigen.  I explained the pathophysiology of PSA.  It is a serine protease that's function in the male reproductive tract is to facilitate the liquefaction of semen.  It is for this reason the body does not want it freely floating in the serum and why typically bound tightly to albumin.  We discussed why we used both a PSA free and total to determine the need for more aggressive therapy. I discussed the normal range.  Additionally, it was in the range of 1 to 4, but more recently has been downgraded to something less than 2 or even approaching 1.  I discussed the risk of family history, particularly the fact that the average male has a 14% risk of prostate cancer and that in the face of a positive diagnosis in a father it will tablet and any  other first-generation relative continued tablet insofar that a father and brother with prostate cancer will produce almost a 50% risk of prostate cancer.  I discussed the use of the temporal use of PSA as the best option for monitoring.  We also discussed the fact that an elevated PSA is an isolated event does not mean that this is prostate cancer and should not engender worry in this regard. I discussed other things that can elevate PSA including constipation, prostatitis, infection, recent intercourse etc., as well as the risks and benefits associated with this.  Also discussed the fact that this is with a dilutional test and as a consequence of such were present produce slight variations on a single specimen.  Further discussed the risks and benefits of a prostate biopsy as well.  If his PSA goes up again he will need a course of finasteride              This document has been electronically signed by JOSE M IBANEZ MD March 3, 2022 15:38 EST

## 2022-03-04 LAB — PSA SERPL-MCNC: 6.42 NG/ML (ref 0–4)

## 2022-03-29 DIAGNOSIS — R97.20 ELEVATED PROSTATE SPECIFIC ANTIGEN (PSA): Primary | ICD-10-CM

## 2022-03-29 RX ORDER — FINASTERIDE 5 MG/1
5 TABLET, FILM COATED ORAL DAILY
Qty: 30 TABLET | Refills: 5 | Status: SHIPPED | OUTPATIENT
Start: 2022-03-29 | End: 2022-10-04 | Stop reason: SDUPTHER

## 2022-07-29 ENCOUNTER — TELEPHONE (OUTPATIENT)
Dept: UROLOGY | Facility: CLINIC | Age: 57
End: 2022-07-29

## 2022-08-01 NOTE — TELEPHONE ENCOUNTER
Routing to Cordelia for advise. He said that he definitely needs an appt in August routing to [facility]

## 2022-08-04 ENCOUNTER — OFFICE VISIT (OUTPATIENT)
Dept: SURGERY | Facility: CLINIC | Age: 57
End: 2022-08-04

## 2022-08-04 VITALS
SYSTOLIC BLOOD PRESSURE: 120 MMHG | HEART RATE: 82 BPM | DIASTOLIC BLOOD PRESSURE: 80 MMHG | WEIGHT: 157 LBS | HEIGHT: 69 IN | BODY MASS INDEX: 23.25 KG/M2

## 2022-08-04 DIAGNOSIS — Z12.11 SCREENING FOR COLON CANCER: Primary | ICD-10-CM

## 2022-08-04 PROCEDURE — 99203 OFFICE O/P NEW LOW 30 MIN: CPT

## 2022-08-04 NOTE — PROGRESS NOTES
Subjective   Bill Centeno is a 56 y.o. male who presents today for Initial Evaluation    Chief Complaint:    Chief Complaint   Patient presents with   • Colonoscopy        History of Present Illness:    History of Present Illness Cam is a 56-year-old male who presents for screening colonoscopy.  He reports that his last colonoscopy was at age 48.  He does report that he had polyps at that time.  Denies a family history of colon cancer.  Denies any blood in his stool.  He reports that he has a bowel movement usually once daily.  Sometimes every other day.  He denies any changes in his bowel habits.  He denies any unintended weight loss.  Denies any abdominal pain.    The following portions of the patient's history were reviewed and updated as appropriate: allergies, current medications, past family history, past medical history, past social history, past surgical history and problem list.    Past Medical History:  Past Medical History:   Diagnosis Date   • Benign prostatic hyperplasia April 2018   • Elevated PSA July 2019   • Hyperlipidemia    • Hypertension    • Kidney stone 2004   • Polycystic kidney disease April 2018   • Renal cyst        Social History:  Social History     Socioeconomic History   • Marital status:    Tobacco Use   • Smoking status: Never Smoker   • Smokeless tobacco: Never Used   Vaping Use   • Vaping Use: Never used   Substance and Sexual Activity   • Alcohol use: No   • Drug use: No   • Sexual activity: Yes     Partners: Female     Birth control/protection: Surgical       Family History:  Family History   Problem Relation Age of Onset   • Cancer Father         Bladder ca   • Prostate cancer Father    • Hypertension Mother        Past Surgical History:  Past Surgical History:   Procedure Laterality Date   • JOINT REPLACEMENT     • LITHOTRIPSY     • VASECTOMY  2003       Problem List:  Patient Active Problem List   Diagnosis   • Renal cyst   • Right ureteral calculus   • Elevated  prostate specific antigen (PSA)   • Family history of prostate cancer in father       Allergy:   No Known Allergies     Current Medications:   Current Outpatient Medications   Medication Sig Dispense Refill   • finasteride (PROSCAR) 5 MG tablet Take 1 tablet by mouth Daily. 30 tablet 5   • lisinopril-hydrochlorothiazide (PRINZIDE,ZESTORETIC) 20-12.5 MG per tablet Take 1 tablet by mouth daily. 90 tablet 3   • meloxicam (MOBIC) 15 MG tablet Take 1 tablet by mouth Daily. 90 tablet 3   • potassium citrate (UROCIT-K) 10 MEQ (1080 MG) CR tablet Take 1 tablet by mouth twice daily. 180 tablet 3   • rosuvastatin (Crestor) 40 MG tablet Take 1 tablet by mouth daily. 90 tablet 3   • valACYclovir (Valtrex) 1000 MG tablet Take 2 tablets by mouth twice daily x 1 day 28 tablet 1   • propranolol (INDERAL) 40 MG tablet Take 1 tablet by mouth Daily. (Patient taking differently: Take 40 mg by mouth As Needed.) 30 tablet 3     No current facility-administered medications for this visit.       Review of Systems:    Review of Systems   Constitutional: Negative for activity change.   HENT: Negative for congestion.    Eyes: Negative for blurred vision.   Respiratory: Negative for shortness of breath.    Cardiovascular: Negative for chest pain.   Gastrointestinal: Negative for abdominal pain and blood in stool.   Endocrine: Negative for cold intolerance.   Genitourinary: Negative for flank pain.   Musculoskeletal: Negative for arthralgias.   Skin: Negative for bruise.   Allergic/Immunologic: Negative for environmental allergies.   Neurological: Negative for confusion.   Hematological: Negative for adenopathy.   Psychiatric/Behavioral: Negative for agitation.         Physical Exam:   Physical Exam  Constitutional:       Appearance: Normal appearance.   HENT:      Head: Normocephalic and atraumatic.      Right Ear: External ear normal.      Left Ear: External ear normal.   Eyes:      Extraocular Movements: Extraocular movements intact.       "Pupils: Pupils are equal, round, and reactive to light.   Cardiovascular:      Rate and Rhythm: Normal rate.      Pulses: Normal pulses.   Pulmonary:      Effort: Pulmonary effort is normal.   Abdominal:      General: Abdomen is flat. Bowel sounds are normal.      Palpations: Abdomen is soft.   Musculoskeletal:         General: Normal range of motion.      Cervical back: Normal range of motion and neck supple.   Skin:     General: Skin is warm.      Capillary Refill: Capillary refill takes less than 2 seconds.   Neurological:      General: No focal deficit present.      Mental Status: He is alert and oriented to person, place, and time.   Psychiatric:         Mood and Affect: Mood normal.         Behavior: Behavior normal.         Vitals:  Blood pressure 120/80, pulse 82, height 175.3 cm (69.02\"), weight 71.2 kg (157 lb).   Body mass index is 23.17 kg/m².        Assessment & Plan   Diagnoses and all orders for this visit:    1. Screening for colon cancer (Primary)  -     Case Request; Standing  -     COVID PRE-OP / PRE-PROCEDURE SCREENING ORDER (NO ISOLATION) - Swab, Nasopharynx; Future  -     Case Request    Other orders  -     Follow anesthesia standing orders.  -     Obtain Informed Consent; Future  -     Provide NPO Instructions to Patient; Future  -     Chlorhexidine Skin Prep; Future    Gurvinder is a 56-year-old male who presents for screening for colon cancer.  He will undergo colonoscopy with Dr. Tidwell.  He verbalized understanding of prep instructions and procedure and wishes to proceed with plan.    Visit Diagnoses:  No diagnosis found.      MEDS ORDERED DURING VISIT:  No orders of the defined types were placed in this encounter.      Return for Follow-up postop.             This document has been electronically signed by BRENDA Reinoso  August 4, 2022 12:07 EDT    Please note that portions of this note were completed with a voice recognition program.   "

## 2022-08-04 NOTE — H&P (VIEW-ONLY)
Subjective   iBll Centeno is a 56 y.o. male who presents today for Initial Evaluation    Chief Complaint:    Chief Complaint   Patient presents with   • Colonoscopy        History of Present Illness:    History of Present Illness Cam is a 56-year-old male who presents for screening colonoscopy.  He reports that his last colonoscopy was at age 48.  He does report that he had polyps at that time.  Denies a family history of colon cancer.  Denies any blood in his stool.  He reports that he has a bowel movement usually once daily.  Sometimes every other day.  He denies any changes in his bowel habits.  He denies any unintended weight loss.  Denies any abdominal pain.    The following portions of the patient's history were reviewed and updated as appropriate: allergies, current medications, past family history, past medical history, past social history, past surgical history and problem list.    Past Medical History:  Past Medical History:   Diagnosis Date   • Benign prostatic hyperplasia April 2018   • Elevated PSA July 2019   • Hyperlipidemia    • Hypertension    • Kidney stone 2004   • Polycystic kidney disease April 2018   • Renal cyst        Social History:  Social History     Socioeconomic History   • Marital status:    Tobacco Use   • Smoking status: Never Smoker   • Smokeless tobacco: Never Used   Vaping Use   • Vaping Use: Never used   Substance and Sexual Activity   • Alcohol use: No   • Drug use: No   • Sexual activity: Yes     Partners: Female     Birth control/protection: Surgical       Family History:  Family History   Problem Relation Age of Onset   • Cancer Father         Bladder ca   • Prostate cancer Father    • Hypertension Mother        Past Surgical History:  Past Surgical History:   Procedure Laterality Date   • JOINT REPLACEMENT     • LITHOTRIPSY     • VASECTOMY  2003       Problem List:  Patient Active Problem List   Diagnosis   • Renal cyst   • Right ureteral calculus   • Elevated  prostate specific antigen (PSA)   • Family history of prostate cancer in father       Allergy:   No Known Allergies     Current Medications:   Current Outpatient Medications   Medication Sig Dispense Refill   • finasteride (PROSCAR) 5 MG tablet Take 1 tablet by mouth Daily. 30 tablet 5   • lisinopril-hydrochlorothiazide (PRINZIDE,ZESTORETIC) 20-12.5 MG per tablet Take 1 tablet by mouth daily. 90 tablet 3   • meloxicam (MOBIC) 15 MG tablet Take 1 tablet by mouth Daily. 90 tablet 3   • potassium citrate (UROCIT-K) 10 MEQ (1080 MG) CR tablet Take 1 tablet by mouth twice daily. 180 tablet 3   • rosuvastatin (Crestor) 40 MG tablet Take 1 tablet by mouth daily. 90 tablet 3   • valACYclovir (Valtrex) 1000 MG tablet Take 2 tablets by mouth twice daily x 1 day 28 tablet 1   • propranolol (INDERAL) 40 MG tablet Take 1 tablet by mouth Daily. (Patient taking differently: Take 40 mg by mouth As Needed.) 30 tablet 3     No current facility-administered medications for this visit.       Review of Systems:    Review of Systems   Constitutional: Negative for activity change.   HENT: Negative for congestion.    Eyes: Negative for blurred vision.   Respiratory: Negative for shortness of breath.    Cardiovascular: Negative for chest pain.   Gastrointestinal: Negative for abdominal pain and blood in stool.   Endocrine: Negative for cold intolerance.   Genitourinary: Negative for flank pain.   Musculoskeletal: Negative for arthralgias.   Skin: Negative for bruise.   Allergic/Immunologic: Negative for environmental allergies.   Neurological: Negative for confusion.   Hematological: Negative for adenopathy.   Psychiatric/Behavioral: Negative for agitation.         Physical Exam:   Physical Exam  Constitutional:       Appearance: Normal appearance.   HENT:      Head: Normocephalic and atraumatic.      Right Ear: External ear normal.      Left Ear: External ear normal.   Eyes:      Extraocular Movements: Extraocular movements intact.       "Pupils: Pupils are equal, round, and reactive to light.   Cardiovascular:      Rate and Rhythm: Normal rate.      Pulses: Normal pulses.   Pulmonary:      Effort: Pulmonary effort is normal.   Abdominal:      General: Abdomen is flat. Bowel sounds are normal.      Palpations: Abdomen is soft.   Musculoskeletal:         General: Normal range of motion.      Cervical back: Normal range of motion and neck supple.   Skin:     General: Skin is warm.      Capillary Refill: Capillary refill takes less than 2 seconds.   Neurological:      General: No focal deficit present.      Mental Status: He is alert and oriented to person, place, and time.   Psychiatric:         Mood and Affect: Mood normal.         Behavior: Behavior normal.         Vitals:  Blood pressure 120/80, pulse 82, height 175.3 cm (69.02\"), weight 71.2 kg (157 lb).   Body mass index is 23.17 kg/m².        Assessment & Plan   Diagnoses and all orders for this visit:    1. Screening for colon cancer (Primary)  -     Case Request; Standing  -     COVID PRE-OP / PRE-PROCEDURE SCREENING ORDER (NO ISOLATION) - Swab, Nasopharynx; Future  -     Case Request    Other orders  -     Follow anesthesia standing orders.  -     Obtain Informed Consent; Future  -     Provide NPO Instructions to Patient; Future  -     Chlorhexidine Skin Prep; Future    Gurvinder is a 56-year-old male who presents for screening for colon cancer.  He will undergo colonoscopy with Dr. Tidwell.  He verbalized understanding of prep instructions and procedure and wishes to proceed with plan.    Visit Diagnoses:  No diagnosis found.      MEDS ORDERED DURING VISIT:  No orders of the defined types were placed in this encounter.      Return for Follow-up postop.             This document has been electronically signed by BRENDA Reinoso  August 4, 2022 12:07 EDT    Please note that portions of this note were completed with a voice recognition program.   "

## 2022-08-18 ENCOUNTER — TELEPHONE (OUTPATIENT)
Dept: SURGERY | Facility: CLINIC | Age: 57
End: 2022-08-18

## 2022-08-18 ENCOUNTER — OFFICE VISIT (OUTPATIENT)
Dept: UROLOGY | Facility: CLINIC | Age: 57
End: 2022-08-18

## 2022-08-18 DIAGNOSIS — N20.0 KIDNEY STONE: Primary | ICD-10-CM

## 2022-08-18 DIAGNOSIS — N20.1 RIGHT URETERAL CALCULUS: ICD-10-CM

## 2022-08-18 DIAGNOSIS — R97.20 ELEVATED PROSTATE SPECIFIC ANTIGEN (PSA): ICD-10-CM

## 2022-08-18 PROCEDURE — 82365 CALCULUS SPECTROSCOPY: CPT | Performed by: UROLOGY

## 2022-08-18 PROCEDURE — 99213 OFFICE O/P EST LOW 20 MIN: CPT | Performed by: UROLOGY

## 2022-08-18 PROCEDURE — 84153 ASSAY OF PSA TOTAL: CPT | Performed by: UROLOGY

## 2022-08-18 NOTE — PROGRESS NOTES
Chief Complaint:   Ureteral calculi    HPI:   56 y.o. male very well-known to me.  He has a large prostate with an elevated PSA he has fairly significant urolithiasis and brought to stones 161 7 mm that he passed spontaneously.  He is very physically active and walks great distances which facilitates the stone passage.  His PSA previously on 3/3/2022 was 6.42 I will notify him the results once available from my standpoint    Past Medical History:     Past Medical History:   Diagnosis Date   • Benign prostatic hyperplasia April 2018   • Elevated PSA July 2019   • Hyperlipidemia    • Hypertension    • Kidney stone 2004   • Polycystic kidney disease April 2018   • Renal cyst        Current Meds:     Current Outpatient Medications   Medication Sig Dispense Refill   • finasteride (PROSCAR) 5 MG tablet Take 1 tablet by mouth Daily. 30 tablet 5   • lisinopril-hydrochlorothiazide (PRINZIDE,ZESTORETIC) 20-12.5 MG per tablet Take 1 tablet by mouth daily. 90 tablet 3   • meloxicam (MOBIC) 15 MG tablet Take 1 tablet by mouth Daily. 90 tablet 3   • potassium citrate (UROCIT-K) 10 MEQ (1080 MG) CR tablet Take 1 tablet by mouth twice daily. 180 tablet 3   • propranolol (INDERAL) 40 MG tablet Take 1 tablet by mouth Daily. (Patient taking differently: Take 40 mg by mouth As Needed.) 30 tablet 3   • rosuvastatin (Crestor) 40 MG tablet Take 1 tablet by mouth daily. 90 tablet 3   • sodium-potassium-magnesium sulfates (SUPREP) 17.5-3.13-1.6 GM/177ML solution oral solution TAKE BY MOUTH AS DIRECTED 354 mL 0   • valACYclovir (Valtrex) 1000 MG tablet Take 2 tablets by mouth twice daily x 1 day 28 tablet 1     No current facility-administered medications for this visit.        Allergies:      No Known Allergies     Past Surgical History:     Past Surgical History:   Procedure Laterality Date   • JOINT REPLACEMENT     • LITHOTRIPSY     • VASECTOMY  2003       Social History:     Social History     Socioeconomic History   • Marital status:     Tobacco Use   • Smoking status: Never Smoker   • Smokeless tobacco: Never Used   Vaping Use   • Vaping Use: Never used   Substance and Sexual Activity   • Alcohol use: No   • Drug use: No   • Sexual activity: Yes     Partners: Female     Birth control/protection: Surgical       Family History:     Family History   Problem Relation Age of Onset   • Cancer Father         Bladder ca   • Prostate cancer Father    • Hypertension Mother        Review of Systems:     Review of Systems   Constitutional: Negative.    HENT: Negative.    Eyes: Negative.    Respiratory: Negative.    Cardiovascular: Negative.    Gastrointestinal: Negative.    Endocrine: Negative.    Musculoskeletal: Negative.    Allergic/Immunologic: Negative.    Neurological: Negative.    Hematological: Negative.    Psychiatric/Behavioral: Negative.        Physical Exam:     Physical Exam  Vitals and nursing note reviewed.   Constitutional:       Appearance: He is well-developed.   HENT:      Head: Normocephalic and atraumatic.   Eyes:      Conjunctiva/sclera: Conjunctivae normal.      Pupils: Pupils are equal, round, and reactive to light.   Cardiovascular:      Rate and Rhythm: Normal rate and regular rhythm.      Heart sounds: Normal heart sounds.   Pulmonary:      Effort: Pulmonary effort is normal.      Breath sounds: Normal breath sounds.   Abdominal:      General: Bowel sounds are normal.      Palpations: Abdomen is soft.   Musculoskeletal:         General: Normal range of motion.      Cervical back: Normal range of motion.   Skin:     General: Skin is warm and dry.   Neurological:      Mental Status: He is alert and oriented to person, place, and time.      Deep Tendon Reflexes: Reflexes are normal and symmetric.   Psychiatric:         Behavior: Behavior normal.         Thought Content: Thought content normal.         Judgment: Judgment normal.         I have reviewed the following portions of the patient's history: Allergies, current  medications, past family history, past medical history, past social history, past surgical history, problem list, and ROS and confirm it is accurate.    Recent Image (CT and/or KUB):      CT Abdomen and Pelvis: No results found for this or any previous visit.       CT Stone Protocol: Results for orders placed during the hospital encounter of 02/24/20    CT Abdomen Pelvis Stone Protocol    Narrative  EXAM: CT ABDOMEN PELVIS STONE PROTOCOL-        TECHNIQUE: Multiple axial CT images were obtained from lung bases  through pubic symphysis WITHOUT administration of IV contrast.  Reformatted images in the coronal and/or sagittal plane(s) were  generated from the axial data set to facilitate diagnostic accuracy  and/or surgical planning.  Oral Contrast:NONE.    Radiation dose reduction techniques were utilized per ALARA protocol.  Automated exposure control was initiated through either or [employer] or  [employer] software packages by  protocol.    DOSE:    Clinical information  Flank pain, stone disease suspected    Comparison  Comparison: 04/02/2018    Findings  LOWER THORAX: STABLE CENTRILOBULAR NODULES IN THE RIGHT LOWER LOBE  LIKELY POST ATYPICAL INFECTIOUS IN ETIOLOGY.    ABDOMEN:    LIVER: STABLE CYST VERSUS HEMANGIOMA RIGHT LOBE OF LIVER.    GALLBLADDER: CHOLELITHIASIS NOTED.    PANCREAS: Unremarkable. No mass or ductal dilatation.    SPLEEN: Homogeneous. No splenomegaly.    ADRENALS: No mass.    KIDNEYS/URETERS: 6 MM PROXIMAL RIGHT URETERAL STONE JUST BEYOND THE  UPJ CAUSING MODERATE RIGHT HYDRONEPHROSIS. MULTIPLE BILATERAL RIGHT  GREATER THAN LEFT NONOBSTRUCTING KIDNEY STONES ARE NOTED. BILATERAL  SIMPLE APPEARING RENAL CYSTS.    GI TRACT: MODERATE CONSTIPATION. NO BOWEL OBSTRUCTION.    PERITONEUM: No free air. No free fluid or loculated fluid  collections.    MESENTERY: Unremarkable.    LYMPH NODES: No lymphadenopathy.    VASCULATURE: No evidence of aneurysm.    ABDOMINAL WALL: No focal hernia or  mass.      OTHER: None.    PELVIS:    BLADDER: No focal mass or significant wall thickening    REPRODUCTIVE: PROSTATE ENLARGEMENT WITH PROSTATE CALCIFICATIONS.    APPENDIX: APPENDIX IS WITHIN NORMAL LIMITS.    BONES: No acute bony abnormality.    Impression  Impression:  1. 6 mm right proximal ureteral stone causing moderate hydronephrosis.  2. Bilateral nonobstructing kidney stones right greater than left.  3. Moderate constipation.  4. Bilateral renal cysts.  5. Cholelithiasis.  6. Other findings as above which appear stable.    This report was finalized on 2/24/2020 9:01 AM by Dr. Steven Abraham MD.       KUB: Results for orders placed in visit on 05/11/20    XR Abdomen KUB    Narrative  EXAMINATION: XR ABDOMEN KUB-    CLINICAL INDICATION: ABD PAIN; R10.9-Unspecified abdominal pain      COMPARISON: 02/24/2020    FINDINGS: One view of the abdomen demonstrates large volume stool in the  colon    Left-sided calcifications, some of which may represent left-sided  lithiasis.    No evidence of bowel obstruction    This report was finalized on 5/12/2020 7:59 AM by Dr. Valeriy Medellin MD.       Labs (past 3 months):      No visits with results within 3 Month(s) from this visit.   Latest known visit with results is:   Office Visit on 03/03/2022   Component Date Value Ref Range Status   • PSA 03/03/2022 6.420 (A) 0.000 - 4.000 ng/mL Final        Procedure:       Assessment/Plan:   PSA testing-I am recommending a PSA blood test that stands for prostate specific antigen.  I discussed the pathophysiology of PSA testing indicating its use in the diagnosis and management of prostate cancer.  I discussed the normal range being 0 to 4, but more appropriately being much closer to 0 to 2 in a normal male.  I discussed the fact that after a certain age we don't recommend PSA testing especially in view of numerous comorbidities, that this will not be a useful test.  I discussed many of the things that can artificially raise PSA  including a recent infection, urinary tract infection, and recent sexual intercourse, or even the type of movement such as manipulation of the prostate from riding a bicycle.  After all this is taken into account when the test is reviewed, the most important use of PSA is the velocity measurement.  In other words, the change of PSA with time is a very important factor in the use and that we look for greater than 20% rise over a year to help us make the prediction of prostate cancer.  I also discussed that the use with prostate cancer indicating that after a radical prostatectomy, the PSA should be 0 and any rise indicates an early biochemical recurrence.  His PSA was up to 6 I have a repeat PSA pending  Ureteral calculus-patient has been diagnosed with a ureteral calculus.  We have discussed the various parameters regarding spontaneous passage including the notion that a 2 mm stone has a high likelihood of spontaneous passage versus a larger stone being caught up in the upper areas of the urinary tract.  We also discussed the medical management of stone disease and the use of medical expulsive therapy in the form of Flomax.  This is used in an off label setting.  We discussed the indicators for intervention including  absolute indicators such as sepsis and uncontrollable severe pain, as well as the relative indicators of moderate pain that is well-controlled with various analgesia.  I also talked about nonoperative management including ambulation and increasing fluids and hot tub as being an effective adjuncts in the treatment of a ureteral stone.  He passed of 6 and a 7 mm ureteral calculus previously was calcium oxalate monohydrate              This document has been electronically signed by JOSE M IBANEZ MD August 18, 2022 15:26 EDT    Dictated Utilizing Dragon Dictation: Part of this note may be an electronic transcription/translation of spoken language to printed text using the Dragon Dictation System.

## 2022-08-19 LAB — PSA SERPL-MCNC: 4.25 NG/ML (ref 0–4)

## 2022-08-22 ENCOUNTER — TELEPHONE (OUTPATIENT)
Dept: UROLOGY | Facility: CLINIC | Age: 57
End: 2022-08-22

## 2022-08-22 NOTE — TELEPHONE ENCOUNTER
----- Message from Alcides Storey MD sent at 8/22/2022  8:56 AM EDT -----  Notify fantastic !!  ----- Message -----  From: Lab, Background User  Sent: 8/19/2022   2:59 AM EDT  To: Alcides Storey MD

## 2022-08-24 ENCOUNTER — HOSPITAL ENCOUNTER (OUTPATIENT)
Facility: HOSPITAL | Age: 57
Setting detail: HOSPITAL OUTPATIENT SURGERY
Discharge: HOME | End: 2022-08-24
Attending: SURGERY | Admitting: SURGERY

## 2022-08-24 ENCOUNTER — ANESTHESIA (OUTPATIENT)
Dept: PERIOP | Facility: HOSPITAL | Age: 57
End: 2022-08-24

## 2022-08-24 ENCOUNTER — ANESTHESIA EVENT (OUTPATIENT)
Dept: PERIOP | Facility: HOSPITAL | Age: 57
End: 2022-08-24

## 2022-08-24 VITALS
BODY MASS INDEX: 22.66 KG/M2 | DIASTOLIC BLOOD PRESSURE: 86 MMHG | HEART RATE: 60 BPM | SYSTOLIC BLOOD PRESSURE: 139 MMHG | RESPIRATION RATE: 16 BRPM | TEMPERATURE: 97.6 F | OXYGEN SATURATION: 100 % | WEIGHT: 153 LBS | HEIGHT: 69 IN

## 2022-08-24 DIAGNOSIS — Z12.11 SCREENING FOR COLON CANCER: ICD-10-CM

## 2022-08-24 LAB
CALCIUM OXALATE DIHYDRATE MFR STONE IR: 60 %
COLOR STONE: NORMAL
COM MFR STONE: 40 %
COMPN STONE: NORMAL
LABORATORY COMMENT REPORT: NORMAL
Lab: NORMAL
Lab: NORMAL
PHOTO: NORMAL
SIZE STONE: NORMAL MM
SPEC SOURCE SUBJ: NORMAL
WT STONE: 259 MG

## 2022-08-24 PROCEDURE — 25010000002 PROPOFOL 10 MG/ML EMULSION: Performed by: NURSE ANESTHETIST, CERTIFIED REGISTERED

## 2022-08-24 RX ORDER — OXYCODONE HYDROCHLORIDE AND ACETAMINOPHEN 5; 325 MG/1; MG/1
1 TABLET ORAL ONCE AS NEEDED
Status: DISCONTINUED | OUTPATIENT
Start: 2022-08-24 | End: 2022-08-24 | Stop reason: HOSPADM

## 2022-08-24 RX ORDER — SODIUM CHLORIDE, SODIUM LACTATE, POTASSIUM CHLORIDE, CALCIUM CHLORIDE 600; 310; 30; 20 MG/100ML; MG/100ML; MG/100ML; MG/100ML
125 INJECTION, SOLUTION INTRAVENOUS ONCE
Status: DISCONTINUED | OUTPATIENT
Start: 2022-08-24 | End: 2022-08-24 | Stop reason: HOSPADM

## 2022-08-24 RX ORDER — SODIUM CHLORIDE 0.9 % (FLUSH) 0.9 %
10 SYRINGE (ML) INJECTION EVERY 12 HOURS SCHEDULED
Status: DISCONTINUED | OUTPATIENT
Start: 2022-08-24 | End: 2022-08-24 | Stop reason: HOSPADM

## 2022-08-24 RX ORDER — MIDAZOLAM HYDROCHLORIDE 1 MG/ML
1 INJECTION INTRAMUSCULAR; INTRAVENOUS
Status: DISCONTINUED | OUTPATIENT
Start: 2022-08-24 | End: 2022-08-24 | Stop reason: HOSPADM

## 2022-08-24 RX ORDER — IPRATROPIUM BROMIDE AND ALBUTEROL SULFATE 2.5; .5 MG/3ML; MG/3ML
3 SOLUTION RESPIRATORY (INHALATION) ONCE AS NEEDED
Status: DISCONTINUED | OUTPATIENT
Start: 2022-08-24 | End: 2022-08-24 | Stop reason: HOSPADM

## 2022-08-24 RX ORDER — SODIUM CHLORIDE, SODIUM LACTATE, POTASSIUM CHLORIDE, CALCIUM CHLORIDE 600; 310; 30; 20 MG/100ML; MG/100ML; MG/100ML; MG/100ML
100 INJECTION, SOLUTION INTRAVENOUS ONCE AS NEEDED
Status: DISCONTINUED | OUTPATIENT
Start: 2022-08-24 | End: 2022-08-24 | Stop reason: HOSPADM

## 2022-08-24 RX ORDER — PROPOFOL 10 MG/ML
VIAL (ML) INTRAVENOUS AS NEEDED
Status: DISCONTINUED | OUTPATIENT
Start: 2022-08-24 | End: 2022-08-24 | Stop reason: SURG

## 2022-08-24 RX ORDER — FENTANYL CITRATE 50 UG/ML
50 INJECTION, SOLUTION INTRAMUSCULAR; INTRAVENOUS
Status: DISCONTINUED | OUTPATIENT
Start: 2022-08-24 | End: 2022-08-24 | Stop reason: HOSPADM

## 2022-08-24 RX ORDER — ONDANSETRON 2 MG/ML
4 INJECTION INTRAMUSCULAR; INTRAVENOUS AS NEEDED
Status: DISCONTINUED | OUTPATIENT
Start: 2022-08-24 | End: 2022-08-24 | Stop reason: HOSPADM

## 2022-08-24 RX ORDER — SODIUM CHLORIDE 0.9 % (FLUSH) 0.9 %
10 SYRINGE (ML) INJECTION AS NEEDED
Status: DISCONTINUED | OUTPATIENT
Start: 2022-08-24 | End: 2022-08-24 | Stop reason: HOSPADM

## 2022-08-24 RX ORDER — MEPERIDINE HYDROCHLORIDE 25 MG/ML
12.5 INJECTION INTRAMUSCULAR; INTRAVENOUS; SUBCUTANEOUS
Status: DISCONTINUED | OUTPATIENT
Start: 2022-08-24 | End: 2022-08-24 | Stop reason: HOSPADM

## 2022-08-24 RX ADMIN — PROPOFOL 50 MG: 10 INJECTION, EMULSION INTRAVENOUS at 10:30

## 2022-08-24 RX ADMIN — PROPOFOL 30 MG: 10 INJECTION, EMULSION INTRAVENOUS at 10:34

## 2022-08-24 RX ADMIN — PROPOFOL 180 MCG/KG/MIN: 10 INJECTION, EMULSION INTRAVENOUS at 10:30

## 2022-08-24 RX ADMIN — PROPOFOL 30 MG: 10 INJECTION, EMULSION INTRAVENOUS at 10:32

## 2022-08-24 NOTE — ANESTHESIA PREPROCEDURE EVALUATION
Anesthesia Evaluation     no history of anesthetic complications:  NPO Solid Status: > 8 hours  NPO Liquid Status: > 8 hours           Airway   Mallampati: II  TM distance: >3 FB  Neck ROM: full  No difficulty expected  Dental - normal exam     Pulmonary - normal exam   Cardiovascular - normal exam    (+) hypertension, hyperlipidemia,       Neuro/Psych  GI/Hepatic/Renal/Endo    (+)   renal disease,     Musculoskeletal     Abdominal  - normal exam   Substance History      OB/GYN          Other                        Anesthesia Plan    ASA 2     general     intravenous induction     Anesthetic plan, risks, benefits, and alternatives have been provided, discussed and informed consent has been obtained with: patient.        CODE STATUS:

## 2022-08-24 NOTE — ANESTHESIA POSTPROCEDURE EVALUATION
Patient: Bill Centeno    Procedure Summary     Date: 08/24/22 Room / Location: [facility] OR [address] [place] [address] COR OR    Anesthesia Start: 1028 Anesthesia Stop: 1044    Procedure: COLONOSCOPY (N/A ) Diagnosis:       Screening for colon cancer      (Screening for colon cancer [Z12.11])    Surgeons: Miguel A Tidwell MD Provider: Husam Coats MD    Anesthesia Type: general ASA Status: 2          Anesthesia Type: general    Vitals  Vitals Value Taken Time   /82 08/24/22 1057   Temp 97.6 °F (36.4 °C) 08/24/22 1047   Pulse 67 08/24/22 1057   Resp 16 08/24/22 1057   SpO2 100 % 08/24/22 1057           Post Anesthesia Care and Evaluation    Patient location during evaluation: PHASE II  Patient participation: complete - patient participated  Level of consciousness: awake and alert  Pain score: 0  Pain management: adequate    Airway patency: patent  Anesthetic complications: No anesthetic complications    Cardiovascular status: acceptable  Respiratory status: acceptable  Hydration status: acceptable

## 2022-08-25 LAB — REF LAB TEST METHOD: NORMAL

## 2022-10-04 DIAGNOSIS — R97.20 ELEVATED PROSTATE SPECIFIC ANTIGEN (PSA): ICD-10-CM

## 2022-10-04 RX ORDER — FINASTERIDE 5 MG/1
5 TABLET, FILM COATED ORAL DAILY
Qty: 30 TABLET | Refills: 5 | Status: SHIPPED | OUTPATIENT
Start: 2022-10-04 | End: 2023-02-20 | Stop reason: SDUPTHER

## 2022-10-04 NOTE — TELEPHONE ENCOUNTER
Caller: JENNIFER    Relationship: SELF    Best call back number: [phone]    Requested Prescriptions:   Requested Prescriptions     Pending Prescriptions Disp Refills   • finasteride (PROSCAR) 5 MG tablet 30 tablet 5     Sig: Take 1 tablet by mouth Daily.        Pharmacy where request should be sent: [facility] (SAME ON FILE )     Additional details provided by patient: PT IS REQUESTING A 90 DAY SUPPLY SINCE ITS CHEAPER IF NOT HE WILL KEEP GETTING THE 30 DAY SUPPLY.     Does the patient have less than a 3 day supply:  [x] Yes  [x] No    Ford Carter Rep   10/04/22 09:02 EDT

## 2022-12-30 ENCOUNTER — TRANSCRIBE ORDERS (OUTPATIENT)
Dept: ADMINISTRATIVE | Facility: HOSPITAL | Age: 57
End: 2022-12-30
Payer: COMMERCIAL

## 2022-12-30 ENCOUNTER — LAB (OUTPATIENT)
Dept: LAB | Facility: HOSPITAL | Age: 57
End: 2022-12-30
Payer: COMMERCIAL

## 2022-12-30 DIAGNOSIS — Z12.5 ENCOUNTER FOR SCREENING FOR MALIGNANT NEOPLASM OF PROSTATE: ICD-10-CM

## 2022-12-30 DIAGNOSIS — I10 ESSENTIAL HYPERTENSION: Primary | ICD-10-CM

## 2022-12-30 LAB
ALBUMIN SERPL-MCNC: 4.2 G/DL (ref 3.5–5.2)
ALBUMIN/GLOB SERPL: 1.7 G/DL
ALP SERPL-CCNC: 49 U/L (ref 39–117)
ALT SERPL W P-5'-P-CCNC: 25 U/L (ref 1–41)
ANION GAP SERPL CALCULATED.3IONS-SCNC: 6.9 MMOL/L (ref 5–15)
AST SERPL-CCNC: 32 U/L (ref 1–40)
BILIRUB SERPL-MCNC: 0.3 MG/DL (ref 0–1.2)
BUN SERPL-MCNC: 22 MG/DL (ref 6–20)
BUN/CREAT SERPL: 18 (ref 7–25)
CALCIUM SPEC-SCNC: 9.7 MG/DL (ref 8.6–10.5)
CHLORIDE SERPL-SCNC: 103 MMOL/L (ref 98–107)
CHOLEST SERPL-MCNC: 196 MG/DL (ref 0–200)
CO2 SERPL-SCNC: 30.1 MMOL/L (ref 22–29)
CREAT SERPL-MCNC: 1.22 MG/DL (ref 0.76–1.27)
EGFRCR SERPLBLD CKD-EPI 2021: 68.3 ML/MIN/1.73
GLOBULIN UR ELPH-MCNC: 2.5 GM/DL
GLUCOSE SERPL-MCNC: 84 MG/DL (ref 65–99)
HDLC SERPL-MCNC: 49 MG/DL (ref 40–60)
LDLC SERPL CALC-MCNC: 105 MG/DL (ref 0–100)
LDLC/HDLC SERPL: 2 {RATIO}
POTASSIUM SERPL-SCNC: 4 MMOL/L (ref 3.5–5.2)
PROT SERPL-MCNC: 6.7 G/DL (ref 6–8.5)
SODIUM SERPL-SCNC: 140 MMOL/L (ref 136–145)
TRIGL SERPL-MCNC: 244 MG/DL (ref 0–150)
VLDLC SERPL-MCNC: 42 MG/DL (ref 5–40)

## 2022-12-30 PROCEDURE — 36415 COLL VENOUS BLD VENIPUNCTURE: CPT | Performed by: PHYSICIAN ASSISTANT

## 2022-12-30 PROCEDURE — 84066 ASSAY PROSTATE PHOSPHATASE: CPT | Performed by: PHYSICIAN ASSISTANT

## 2022-12-30 PROCEDURE — 80061 LIPID PANEL: CPT | Performed by: PHYSICIAN ASSISTANT

## 2022-12-30 PROCEDURE — 80053 COMPREHEN METABOLIC PANEL: CPT | Performed by: PHYSICIAN ASSISTANT

## 2023-01-04 LAB — PACP SER-MCNC: 1.1 NG/ML (ref 0–3.5)

## 2023-02-20 ENCOUNTER — OFFICE VISIT (OUTPATIENT)
Dept: UROLOGY | Facility: CLINIC | Age: 58
End: 2023-02-20
Payer: COMMERCIAL

## 2023-02-20 VITALS — BODY MASS INDEX: 22.66 KG/M2 | HEIGHT: 69 IN | WEIGHT: 153 LBS

## 2023-02-20 DIAGNOSIS — N28.1 RENAL CYST: Primary | ICD-10-CM

## 2023-02-20 DIAGNOSIS — R97.20 ELEVATED PROSTATE SPECIFIC ANTIGEN (PSA): ICD-10-CM

## 2023-02-20 DIAGNOSIS — N20.1 RIGHT URETERAL CALCULUS: ICD-10-CM

## 2023-02-20 PROCEDURE — 84153 ASSAY OF PSA TOTAL: CPT | Performed by: UROLOGY

## 2023-02-20 PROCEDURE — 99213 OFFICE O/P EST LOW 20 MIN: CPT | Performed by: UROLOGY

## 2023-02-20 RX ORDER — FINASTERIDE 5 MG/1
5 TABLET, FILM COATED ORAL DAILY
Qty: 30 TABLET | Refills: 5 | Status: SHIPPED | OUTPATIENT
Start: 2023-02-20 | End: 2023-02-20

## 2023-02-20 RX ORDER — KETOROLAC TROMETHAMINE 10 MG/1
10 TABLET, FILM COATED ORAL EVERY 6 HOURS PRN
Qty: 16 TABLET | Refills: 2 | Status: SHIPPED | OUTPATIENT
Start: 2023-02-20

## 2023-02-20 RX ORDER — FINASTERIDE 5 MG/1
5 TABLET, FILM COATED ORAL DAILY
Qty: 903 TABLET | Refills: 3 | Status: SHIPPED | OUTPATIENT
Start: 2023-02-20

## 2023-02-20 RX ORDER — TAMSULOSIN HYDROCHLORIDE 0.4 MG/1
1 CAPSULE ORAL NIGHTLY
Qty: 30 CAPSULE | Refills: 5 | Status: SHIPPED | OUTPATIENT
Start: 2023-02-20

## 2023-02-20 RX ORDER — ONDANSETRON 4 MG/1
4 TABLET, FILM COATED ORAL DAILY PRN
Qty: 30 TABLET | Refills: 1 | Status: SHIPPED | OUTPATIENT
Start: 2023-02-20

## 2023-02-20 NOTE — PROGRESS NOTES
Chief Complaint:      Chief Complaint   Patient presents with   • Elevated PSA       HPI:   57 y.o. male returns today.  Doing well last PSA 4.2518 1822 he is doing great other than that.  He passes stones he is having no complaints problems Labs pending I will see him back in 6 months.    Past Medical History:     Past Medical History:   Diagnosis Date   • Arthritis    • Benign prostatic hyperplasia 04/2018   • Elevated cholesterol    • Elevated PSA 07/2019   • Hyperlipidemia    • Hypertension    • Kidney stone 2004   • Polycystic kidney disease 04/2018   • Renal cyst        Current Meds:     Current Outpatient Medications   Medication Sig Dispense Refill   • finasteride (PROSCAR) 5 MG tablet Take 1 tablet by mouth Daily. 30 tablet 5   • lisinopril-hydrochlorothiazide (PRINZIDE,ZESTORETIC) 20-12.5 MG per tablet Take 1 tablet by mouth daily. 90 tablet 3   • meloxicam (MOBIC) 15 MG tablet Take 1 tablet (15 mg) by mouth once daily. 90 tablet 3   • potassium citrate (UROCIT-K) 10 MEQ (1080 MG) CR tablet Take 1 tablet by mouth twice daily. 180 tablet 3   • rosuvastatin (Crestor) 40 MG tablet Take 1 tablet by mouth daily. 90 tablet 3   • valACYclovir (Valtrex) 1000 MG tablet Take 2 tablets by mouth twice daily x 1 day 28 tablet 1   • meloxicam (MOBIC) 15 MG tablet Take 1 tablet by mouth Daily. 90 tablet 3   • propranolol (INDERAL) 40 MG tablet Take 1 tablet by mouth Daily. (Patient taking differently: Take 40 mg by mouth As Needed.) 30 tablet 3   • sodium-potassium-magnesium sulfates (SUPREP) 17.5-3.13-1.6 GM/177ML solution oral solution TAKE BY MOUTH AS DIRECTED 354 mL 0     No current facility-administered medications for this visit.        Allergies:      No Known Allergies     Past Surgical History:     Past Surgical History:   Procedure Laterality Date   • COLONOSCOPY     • COLONOSCOPY N/A 8/24/2022    Procedure: COLONOSCOPY;  Surgeon: Miguel A Tidwell MD;  Location: [facility] OR;  Service: Gastroenterology;   Laterality: N/A;   • JOINT REPLACEMENT     • LITHOTRIPSY     • VASECTOMY  2003       Social History:     Social History     Socioeconomic History   • Marital status:    Tobacco Use   • Smoking status: Never   • Smokeless tobacco: Never   Vaping Use   • Vaping Use: Never used   Substance and Sexual Activity   • Alcohol use: No   • Drug use: No   • Sexual activity: Yes     Partners: Female     Birth control/protection: Surgical       Family History:     Family History   Problem Relation Age of Onset   • Cancer Father         Bladder ca   • Prostate cancer Father    • Hypertension Mother        Review of Systems:     Review of Systems   Constitutional: Negative.  Negative for chills, fatigue and fever.   HENT: Negative.    Eyes: Negative.    Respiratory: Negative.  Negative for cough, shortness of breath and wheezing.    Cardiovascular: Negative.    Gastrointestinal: Negative.  Negative for abdominal pain, nausea and vomiting.   Endocrine: Negative.    Genitourinary: Negative for difficulty urinating, dysuria, genital sores, penile pain and testicular pain.   Musculoskeletal: Negative.  Negative for back pain and joint swelling.   Allergic/Immunologic: Negative.    Neurological: Negative.  Negative for dizziness and headaches.   Hematological: Negative.    Psychiatric/Behavioral: Negative.        Physical Exam:     Physical Exam  Vitals and nursing note reviewed.   Constitutional:       Appearance: He is well-developed.   HENT:      Head: Normocephalic and atraumatic.   Eyes:      Conjunctiva/sclera: Conjunctivae normal.      Pupils: Pupils are equal, round, and reactive to light.   Cardiovascular:      Rate and Rhythm: Normal rate and regular rhythm.      Heart sounds: Normal heart sounds.   Pulmonary:      Effort: Pulmonary effort is normal.      Breath sounds: Normal breath sounds.   Abdominal:      General: Bowel sounds are normal.      Palpations: Abdomen is soft.   Musculoskeletal:         General: Normal  range of motion.      Cervical back: Normal range of motion.   Skin:     General: Skin is warm and dry.   Neurological:      Mental Status: He is alert and oriented to person, place, and time.      Deep Tendon Reflexes: Reflexes are normal and symmetric.   Psychiatric:         Behavior: Behavior normal.         Thought Content: Thought content normal.         Judgment: Judgment normal.         I have reviewed the following portions of the patient's history: Allergies, current medications, past family history, past medical history, past social history, past surgical history, problem list, and ROS and confirm it is accurate.    Recent Image (CT and/or KUB):      CT Abdomen and Pelvis: No results found for this or any previous visit.       CT Stone Protocol: Results for orders placed during the hospital encounter of 02/24/20    CT Abdomen Pelvis Stone Protocol    Narrative  EXAM: CT ABDOMEN PELVIS STONE PROTOCOL-        TECHNIQUE: Multiple axial CT images were obtained from lung bases  through pubic symphysis WITHOUT administration of IV contrast.  Reformatted images in the coronal and/or sagittal plane(s) were  generated from the axial data set to facilitate diagnostic accuracy  and/or surgical planning.  Oral Contrast:NONE.    Radiation dose reduction techniques were utilized per ALARA protocol.  Automated exposure control was initiated through either or [employer] or  [employer] software packages by  protocol.    DOSE:    Clinical information  Flank pain, stone disease suspected    Comparison  Comparison: 04/02/2018    Findings  LOWER THORAX: STABLE CENTRILOBULAR NODULES IN THE RIGHT LOWER LOBE  LIKELY POST ATYPICAL INFECTIOUS IN ETIOLOGY.    ABDOMEN:    LIVER: STABLE CYST VERSUS HEMANGIOMA RIGHT LOBE OF LIVER.    GALLBLADDER: CHOLELITHIASIS NOTED.    PANCREAS: Unremarkable. No mass or ductal dilatation.    SPLEEN: Homogeneous. No splenomegaly.    ADRENALS: No mass.    KIDNEYS/URETERS: 6 MM PROXIMAL RIGHT  URETERAL STONE JUST BEYOND THE  UPJ CAUSING MODERATE RIGHT HYDRONEPHROSIS. MULTIPLE BILATERAL RIGHT  GREATER THAN LEFT NONOBSTRUCTING KIDNEY STONES ARE NOTED. BILATERAL  SIMPLE APPEARING RENAL CYSTS.    GI TRACT: MODERATE CONSTIPATION. NO BOWEL OBSTRUCTION.    PERITONEUM: No free air. No free fluid or loculated fluid  collections.    MESENTERY: Unremarkable.    LYMPH NODES: No lymphadenopathy.    VASCULATURE: No evidence of aneurysm.    ABDOMINAL WALL: No focal hernia or mass.      OTHER: None.    PELVIS:    BLADDER: No focal mass or significant wall thickening    REPRODUCTIVE: PROSTATE ENLARGEMENT WITH PROSTATE CALCIFICATIONS.    APPENDIX: APPENDIX IS WITHIN NORMAL LIMITS.    BONES: No acute bony abnormality.    Impression  Impression:  1. 6 mm right proximal ureteral stone causing moderate hydronephrosis.  2. Bilateral nonobstructing kidney stones right greater than left.  3. Moderate constipation.  4. Bilateral renal cysts.  5. Cholelithiasis.  6. Other findings as above which appear stable.    This report was finalized on 2/24/2020 9:01 AM by Dr. Steven Abraham MD.       KUB: Results for orders placed in visit on 05/11/20    XR Abdomen KUB    Narrative  EXAMINATION: XR ABDOMEN KUB-    CLINICAL INDICATION: ABD PAIN; R10.9-Unspecified abdominal pain      COMPARISON: 02/24/2020    FINDINGS: One view of the abdomen demonstrates large volume stool in the  colon    Left-sided calcifications, some of which may represent left-sided  lithiasis.    No evidence of bowel obstruction    This report was finalized on 5/12/2020 7:59 AM by Dr. Valeriy Medellin MD.       Labs (past 3 months):      Transcribe Orders on 12/30/2022   Component Date Value Ref Range Status   • Glucose 12/30/2022 84  65 - 99 mg/dL Final   • BUN 12/30/2022 22 (H)  6 - 20 mg/dL Final   • Creatinine 12/30/2022 1.22  0.76 - 1.27 mg/dL Final   • Sodium 12/30/2022 140  136 - 145 mmol/L Final   • Potassium 12/30/2022 4.0  3.5 - 5.2 mmol/L Final   • Chloride  12/30/2022 103  98 - 107 mmol/L Final   • CO2 12/30/2022 30.1 (H)  22.0 - 29.0 mmol/L Final   • Calcium 12/30/2022 9.7  8.6 - 10.5 mg/dL Final   • Total Protein 12/30/2022 6.7  6.0 - 8.5 g/dL Final   • Albumin 12/30/2022 4.2  3.5 - 5.2 g/dL Final   • ALT (SGPT) 12/30/2022 25  1 - 41 U/L Final   • AST (SGOT) 12/30/2022 32  1 - 40 U/L Final   • Alkaline Phosphatase 12/30/2022 49  39 - 117 U/L Final   • Total Bilirubin 12/30/2022 0.3  0.0 - 1.2 mg/dL Final   • Globulin 12/30/2022 2.5  gm/dL Final   • A/G Ratio 12/30/2022 1.7  g/dL Final   • BUN/Creatinine Ratio 12/30/2022 18.0  7.0 - 25.0 Final   • Anion Gap 12/30/2022 6.9  5.0 - 15.0 mmol/L Final   • eGFR 12/30/2022 68.3  >60.0 mL/min/1.73 Final    National Kidney Foundation and American Society of Nephrology (ASN) Task Force recommended calculation based on the Chronic Kidney Disease Epidemiology Collaboration (CKD-EPI) equation refit without adjustment for race.   • Total Cholesterol 12/30/2022 196  0 - 200 mg/dL Final   • Triglycerides 12/30/2022 244 (H)  0 - 150 mg/dL Final   • HDL Cholesterol 12/30/2022 49  40 - 60 mg/dL Final   • LDL Cholesterol  12/30/2022 105 (H)  0 - 100 mg/dL Final   • VLDL Cholesterol 12/30/2022 42 (H)  5 - 40 mg/dL Final   • LDL/HDL Ratio 12/30/2022 2.00   Final   • Prostatic Acid Phosphatase 12/30/2022 1.1  0.0 - 3.5 ng/mL Final    [employer] myBarristerulite 2000 Immunochemiluminometric assay (ICMA)  Values obtained with different assay methods or kits cannot be  used interchangeably. Results cannot be interpreted as absolute  evidence of the presence or absence of malignant disease.        Procedure:       Assessment/Plan:   BPH: Discussed the pathophysiology of BPH and obstruction.  We discussed the static and dynamic effects of BPH as well as using 5 alpha reductase inhibitors versus alpha blockade.  We discussed the indications for transurethral surgery as well and/ or other therapeutic options available including all of the newer techniques.   He has had a good response to finasteride with significant drop in his PSA  Recurrent urolithiasis-we discussed in fluid intake              This document has been electronically signed by JOSE M IBANEZ MD February 20, 2023 15:45 EST    Dictated Utilizing Dragon Dictation: Part of this note may be an electronic transcription/translation of spoken language to printed text using the Dragon Dictation System.

## 2023-02-21 LAB — PSA SERPL-MCNC: 2.62 NG/ML (ref 0–4)

## 2023-02-24 ENCOUNTER — TELEPHONE (OUTPATIENT)
Dept: UROLOGY | Facility: CLINIC | Age: 58
End: 2023-02-24
Payer: COMMERCIAL

## 2023-02-24 NOTE — TELEPHONE ENCOUNTER
I called and left a vm      ----- Message from Alcides Storey MD sent at 2/24/2023  5:44 AM EST -----  Notify great PSA  ----- Message -----  From: Lab, Background User  Sent: 2/21/2023   5:19 AM EST  To: Alcides Storey MD

## 2023-09-13 ENCOUNTER — LAB (OUTPATIENT)
Dept: LAB | Facility: HOSPITAL | Age: 58
End: 2023-09-13
Payer: COMMERCIAL

## 2023-09-13 ENCOUNTER — TRANSCRIBE ORDERS (OUTPATIENT)
Dept: ADMINISTRATIVE | Facility: HOSPITAL | Age: 58
End: 2023-09-13
Payer: COMMERCIAL

## 2023-09-13 DIAGNOSIS — E78.2 MIXED HYPERLIPIDEMIA: ICD-10-CM

## 2023-09-13 DIAGNOSIS — E10.10 TYPE I DIABETES MELLITUS WITH KETOACIDOSIS, UNCONTROLLED: Primary | ICD-10-CM

## 2023-09-13 DIAGNOSIS — E10.10 TYPE I DIABETES MELLITUS WITH KETOACIDOSIS, UNCONTROLLED: ICD-10-CM

## 2023-09-13 DIAGNOSIS — Z12.5 SPECIAL SCREENING FOR MALIGNANT NEOPLASM OF PROSTATE: ICD-10-CM

## 2023-09-13 LAB
ALBUMIN SERPL-MCNC: 4.8 G/DL (ref 3.5–5.2)
ALBUMIN/GLOB SERPL: 1.8 G/DL
ALP SERPL-CCNC: 45 U/L (ref 39–117)
ALT SERPL W P-5'-P-CCNC: 21 U/L (ref 1–41)
ANION GAP SERPL CALCULATED.3IONS-SCNC: 8.3 MMOL/L (ref 5–15)
AST SERPL-CCNC: 28 U/L (ref 1–40)
BASOPHILS # BLD AUTO: 0.04 10*3/MM3 (ref 0–0.2)
BASOPHILS NFR BLD AUTO: 0.8 % (ref 0–1.5)
BILIRUB SERPL-MCNC: 0.6 MG/DL (ref 0–1.2)
BUN SERPL-MCNC: 21 MG/DL (ref 6–20)
BUN/CREAT SERPL: 22.3 (ref 7–25)
CALCIUM SPEC-SCNC: 10 MG/DL (ref 8.6–10.5)
CHLORIDE SERPL-SCNC: 103 MMOL/L (ref 98–107)
CHOLEST SERPL-MCNC: 211 MG/DL (ref 0–200)
CO2 SERPL-SCNC: 29.7 MMOL/L (ref 22–29)
CREAT SERPL-MCNC: 0.94 MG/DL (ref 0.76–1.27)
DEPRECATED RDW RBC AUTO: 43.3 FL (ref 37–54)
EGFRCR SERPLBLD CKD-EPI 2021: 93.4 ML/MIN/1.73
EOSINOPHIL # BLD AUTO: 0.27 10*3/MM3 (ref 0–0.4)
EOSINOPHIL NFR BLD AUTO: 5.2 % (ref 0.3–6.2)
ERYTHROCYTE [DISTWIDTH] IN BLOOD BY AUTOMATED COUNT: 12.5 % (ref 12.3–15.4)
GLOBULIN UR ELPH-MCNC: 2.6 GM/DL
GLUCOSE SERPL-MCNC: 94 MG/DL (ref 65–99)
HCT VFR BLD AUTO: 45.6 % (ref 37.5–51)
HDLC SERPL-MCNC: 67 MG/DL (ref 40–60)
HGB BLD-MCNC: 15 G/DL (ref 13–17.7)
IMM GRANULOCYTES # BLD AUTO: 0.01 10*3/MM3 (ref 0–0.05)
IMM GRANULOCYTES NFR BLD AUTO: 0.2 % (ref 0–0.5)
LDLC SERPL CALC-MCNC: 127 MG/DL (ref 0–100)
LDLC/HDLC SERPL: 1.86 {RATIO}
LYMPHOCYTES # BLD AUTO: 1.45 10*3/MM3 (ref 0.7–3.1)
LYMPHOCYTES NFR BLD AUTO: 28 % (ref 19.6–45.3)
MCH RBC QN AUTO: 30.7 PG (ref 26.6–33)
MCHC RBC AUTO-ENTMCNC: 32.9 G/DL (ref 31.5–35.7)
MCV RBC AUTO: 93.4 FL (ref 79–97)
MONOCYTES # BLD AUTO: 0.58 10*3/MM3 (ref 0.1–0.9)
MONOCYTES NFR BLD AUTO: 11.2 % (ref 5–12)
NEUTROPHILS NFR BLD AUTO: 2.82 10*3/MM3 (ref 1.7–7)
NEUTROPHILS NFR BLD AUTO: 54.6 % (ref 42.7–76)
NRBC BLD AUTO-RTO: 0 /100 WBC (ref 0–0.2)
PLATELET # BLD AUTO: 186 10*3/MM3 (ref 140–450)
PMV BLD AUTO: 11.9 FL (ref 6–12)
POTASSIUM SERPL-SCNC: 4.3 MMOL/L (ref 3.5–5.2)
PROT SERPL-MCNC: 7.4 G/DL (ref 6–8.5)
RBC # BLD AUTO: 4.88 10*6/MM3 (ref 4.14–5.8)
SODIUM SERPL-SCNC: 141 MMOL/L (ref 136–145)
TRIGL SERPL-MCNC: 97 MG/DL (ref 0–150)
TSH SERPL DL<=0.05 MIU/L-ACNC: 3.95 UIU/ML (ref 0.27–4.2)
VLDLC SERPL-MCNC: 17 MG/DL (ref 5–40)
WBC NRBC COR # BLD: 5.17 10*3/MM3 (ref 3.4–10.8)

## 2023-09-13 PROCEDURE — 80050 GENERAL HEALTH PANEL: CPT

## 2023-09-13 PROCEDURE — 80061 LIPID PANEL: CPT

## 2023-09-13 PROCEDURE — 84153 ASSAY OF PSA TOTAL: CPT

## 2023-09-13 PROCEDURE — 36415 COLL VENOUS BLD VENIPUNCTURE: CPT

## 2023-09-14 LAB — PSA SERPL-MCNC: 4.05 NG/ML (ref 0–4)

## 2023-10-12 ENCOUNTER — OFFICE VISIT (OUTPATIENT)
Dept: UROLOGY | Facility: CLINIC | Age: 58
End: 2023-10-12
Payer: COMMERCIAL

## 2023-10-12 VITALS
SYSTOLIC BLOOD PRESSURE: 143 MMHG | WEIGHT: 156 LBS | DIASTOLIC BLOOD PRESSURE: 83 MMHG | HEIGHT: 69 IN | BODY MASS INDEX: 23.11 KG/M2 | HEART RATE: 89 BPM

## 2023-10-12 DIAGNOSIS — N20.1 RIGHT URETERAL CALCULUS: ICD-10-CM

## 2023-10-12 DIAGNOSIS — R97.20 ELEVATED PROSTATE SPECIFIC ANTIGEN (PSA): ICD-10-CM

## 2023-10-12 DIAGNOSIS — Z80.42 FAMILY HISTORY OF PROSTATE CANCER IN FATHER: Primary | ICD-10-CM

## 2023-10-12 NOTE — PROGRESS NOTES
Chief Complaint:      Chief Complaint   Patient presents with    Elevated PSA     6 MONTH       HPI:   57 y.o. male returns today has a history of urolithiasis a positive history for prostate cancer in the family last year his PSA was elevated.  Examination is unremarkable I am going to recommend a prostate MRI based on the level of the PSA    Past Medical History:     Past Medical History:   Diagnosis Date    Arthritis     Benign prostatic hyperplasia 04/2018    Elevated cholesterol     Elevated PSA 07/2019    Hyperlipidemia     Hypertension     Kidney stone 2004    Polycystic kidney disease 04/2018    Renal cyst        Current Meds:     Current Outpatient Medications   Medication Sig Dispense Refill    finasteride (PROSCAR) 5 MG tablet Take 1 tablet by mouth Daily. 903 tablet 3    ketorolac (TORADOL) 10 MG tablet Take 1 tablet by mouth Every 6 (Six) Hours As Needed for Moderate Pain. 16 tablet 2    lisinopril-hydrochlorothiazide (PRINZIDE,ZESTORETIC) 20-12.5 MG per tablet Take 1 tablet by mouth daily. 90 tablet 3    lisinopril-hydrochlorothiazide (PRINZIDE,ZESTORETIC) 20-12.5 MG per tablet Take 1 tablet by mouth once daily 90 tablet 3    meloxicam (MOBIC) 15 MG tablet Take 1 tablet by mouth Daily. 90 tablet 3    meloxicam (MOBIC) 15 MG tablet Take 1 tablet (15 mg) by mouth once daily. 90 tablet 3    ondansetron (Zofran) 4 MG tablet Take 1 tablet by mouth Daily As Needed for Nausea or Vomiting. 30 tablet 1    potassium citrate (UROCIT-K) 10 MEQ (1080 MG) CR tablet Take 1 tablet by mouth twice daily 180 tablet 3    rosuvastatin (Crestor) 40 MG tablet Take 1 tablet by mouth daily 90 tablet 3    tamsulosin (Flomax) 0.4 MG capsule 24 hr capsule Take 1 capsule by mouth Every Night. 30 capsule 5    meloxicam (MOBIC) 15 MG tablet Take 1 tablet by mouth Daily. 90 tablet 3    propranolol (INDERAL) 40 MG tablet Take 1 tablet by mouth Daily. (Patient taking differently: Take 40 mg by mouth As Needed.) 30 tablet 3     rosuvastatin (Crestor) 40 MG tablet Take 1 tablet by mouth daily. 90 tablet 3    sodium-potassium-magnesium sulfates (SUPREP) 17.5-3.13-1.6 GM/177ML solution oral solution TAKE BY MOUTH AS DIRECTED 354 mL 0    valACYclovir (Valtrex) 1000 MG tablet Take 2 tablets by mouth twice daily x 1 day 28 tablet 1     No current facility-administered medications for this visit.        Allergies:      No Known Allergies     Past Surgical History:     Past Surgical History:   Procedure Laterality Date    COLONOSCOPY      COLONOSCOPY N/A 8/24/2022    Procedure: COLONOSCOPY;  Surgeon: Miguel A Tidwell MD;  Location: [facility];  Service: Gastroenterology;  Laterality: N/A;    JOINT REPLACEMENT      LITHOTRIPSY      VASECTOMY  2003       Social History:     Social History     Socioeconomic History    Marital status:    Tobacco Use    Smoking status: Never    Smokeless tobacco: Never   Vaping Use    Vaping Use: Never used   Substance and Sexual Activity    Alcohol use: No    Drug use: No    Sexual activity: Yes     Partners: Female     Birth control/protection: Surgical       Family History:     Family History   Problem Relation Age of Onset    Cancer Father         Bladder ca    Prostate cancer Father     Hypertension Mother        Review of Systems:     Review of Systems   Constitutional:  Negative for chills, fatigue and fever.   HENT: Negative.     Eyes: Negative.    Respiratory:  Negative for cough and shortness of breath.    Cardiovascular:  Negative for leg swelling.   Gastrointestinal:  Negative for abdominal pain, nausea and vomiting.   Endocrine: Negative.    Genitourinary:  Negative for difficulty urinating, dysuria, flank pain, frequency, penile pain, penile swelling, scrotal swelling and testicular pain.   Musculoskeletal:  Negative for back pain and joint swelling.   Skin: Negative.    Allergic/Immunologic: Negative.    Neurological:  Negative for dizziness and headaches.   Hematological: Negative.     Psychiatric/Behavioral:  Negative for confusion.        Physical Exam:     Physical Exam  Vitals and nursing note reviewed.   Constitutional:       Appearance: He is well-developed.   HENT:      Head: Normocephalic and atraumatic.   Eyes:      Conjunctiva/sclera: Conjunctivae normal.      Pupils: Pupils are equal, round, and reactive to light.   Cardiovascular:      Rate and Rhythm: Normal rate and regular rhythm.      Heart sounds: Normal heart sounds.   Pulmonary:      Effort: Pulmonary effort is normal.      Breath sounds: Normal breath sounds.   Abdominal:      General: Bowel sounds are normal.      Palpations: Abdomen is soft.   Musculoskeletal:         General: Normal range of motion.      Cervical back: Normal range of motion.   Skin:     General: Skin is warm and dry.   Neurological:      Mental Status: He is alert and oriented to person, place, and time.      Deep Tendon Reflexes: Reflexes are normal and symmetric.   Psychiatric:         Behavior: Behavior normal.         Thought Content: Thought content normal.         Judgment: Judgment normal.         I have reviewed the following portions of the patient's history: Allergies, current medications, past family history, past medical history, past social history, past surgical history, problem list, and ROS and confirm it is accurate.    Recent Image (CT and/or KUB):      CT Abdomen and Pelvis: No results found for this or any previous visit.       CT Stone Protocol: Results for orders placed during the hospital encounter of 02/24/20    CT Abdomen Pelvis Stone Protocol    Narrative  EXAM: CT ABDOMEN PELVIS STONE PROTOCOL-        TECHNIQUE: Multiple axial CT images were obtained from lung bases  through pubic symphysis WITHOUT administration of IV contrast.  Reformatted images in the coronal and/or sagittal plane(s) were  generated from the axial data set to facilitate diagnostic accuracy  and/or surgical planning.  Oral Contrast:NONE.    Radiation dose  reduction techniques were utilized per ALARA protocol.  Automated exposure control was initiated through either or [email or web address] or  [employer] software packages by  protocol.    DOSE:    Clinical information  Flank pain, stone disease suspected    Comparison  Comparison: 04/02/2018    Findings  LOWER THORAX: STABLE CENTRILOBULAR NODULES IN THE RIGHT LOWER LOBE  LIKELY POST ATYPICAL INFECTIOUS IN ETIOLOGY.    ABDOMEN:    LIVER: STABLE CYST VERSUS HEMANGIOMA RIGHT LOBE OF LIVER.    GALLBLADDER: CHOLELITHIASIS NOTED.    PANCREAS: Unremarkable. No mass or ductal dilatation.    SPLEEN: Homogeneous. No splenomegaly.    ADRENALS: No mass.    KIDNEYS/URETERS: 6 MM PROXIMAL RIGHT URETERAL STONE JUST BEYOND THE  UPJ CAUSING MODERATE RIGHT HYDRONEPHROSIS. MULTIPLE BILATERAL RIGHT  GREATER THAN LEFT NONOBSTRUCTING KIDNEY STONES ARE NOTED. BILATERAL  SIMPLE APPEARING RENAL CYSTS.    GI TRACT: MODERATE CONSTIPATION. NO BOWEL OBSTRUCTION.    PERITONEUM: No free air. No free fluid or loculated fluid  collections.    MESENTERY: Unremarkable.    LYMPH NODES: No lymphadenopathy.    VASCULATURE: No evidence of aneurysm.    ABDOMINAL WALL: No focal hernia or mass.      OTHER: None.    PELVIS:    BLADDER: No focal mass or significant wall thickening    REPRODUCTIVE: PROSTATE ENLARGEMENT WITH PROSTATE CALCIFICATIONS.    APPENDIX: APPENDIX IS WITHIN NORMAL LIMITS.    BONES: No acute bony abnormality.    Impression  Impression:  1. 6 mm right proximal ureteral stone causing moderate hydronephrosis.  2. Bilateral nonobstructing kidney stones right greater than left.  3. Moderate constipation.  4. Bilateral renal cysts.  5. Cholelithiasis.  6. Other findings as above which appear stable.    This report was finalized on 2/24/2020 9:01 AM by Dr. Steven Abraham MD.       KUB: Results for orders placed in visit on 05/11/20    XR Abdomen KUB    Narrative  EXAMINATION: XR ABDOMEN KUB-    CLINICAL INDICATION: ABD PAIN; R10.9-Unspecified  abdominal pain      COMPARISON: 02/24/2020    FINDINGS: One view of the abdomen demonstrates large volume stool in the  colon    Left-sided calcifications, some of which may represent left-sided  lithiasis.    No evidence of bowel obstruction    This report was finalized on 5/12/2020 7:59 AM by Dr. Valeriy Medellin MD.       Labs (past 3 months):      Lab on 09/13/2023   Component Date Value Ref Range Status    Glucose 09/13/2023 94  65 - 99 mg/dL Final    BUN 09/13/2023 21 (H)  6 - 20 mg/dL Final    Creatinine 09/13/2023 0.94  0.76 - 1.27 mg/dL Final    Sodium 09/13/2023 141  136 - 145 mmol/L Final    Potassium 09/13/2023 4.3  3.5 - 5.2 mmol/L Final    Chloride 09/13/2023 103  98 - 107 mmol/L Final    CO2 09/13/2023 29.7 (H)  22.0 - 29.0 mmol/L Final    Calcium 09/13/2023 10.0  8.6 - 10.5 mg/dL Final    Total Protein 09/13/2023 7.4  6.0 - 8.5 g/dL Final    Albumin 09/13/2023 4.8  3.5 - 5.2 g/dL Final    ALT (SGPT) 09/13/2023 21  1 - 41 U/L Final    AST (SGOT) 09/13/2023 28  1 - 40 U/L Final    Alkaline Phosphatase 09/13/2023 45  39 - 117 U/L Final    Total Bilirubin 09/13/2023 0.6  0.0 - 1.2 mg/dL Final    Globulin 09/13/2023 2.6  gm/dL Final    A/G Ratio 09/13/2023 1.8  g/dL Final    BUN/Creatinine Ratio 09/13/2023 22.3  7.0 - 25.0 Final    Anion Gap 09/13/2023 8.3  5.0 - 15.0 mmol/L Final    eGFR 09/13/2023 93.4  >60.0 mL/min/1.73 Final    Total Cholesterol 09/13/2023 211 (H)  0 - 200 mg/dL Final    Triglycerides 09/13/2023 97  0 - 150 mg/dL Final    HDL Cholesterol 09/13/2023 67 (H)  40 - 60 mg/dL Final    LDL Cholesterol  09/13/2023 127 (H)  0 - 100 mg/dL Final    VLDL Cholesterol 09/13/2023 17  5 - 40 mg/dL Final    LDL/HDL Ratio 09/13/2023 1.86   Final    PSA 09/13/2023 4.050 (H)  0.000 - 4.000 ng/mL Final    TSH 09/13/2023 3.950  0.270 - 4.200 uIU/mL Final    WBC 09/13/2023 5.17  3.40 - 10.80 10*3/mm3 Final    RBC 09/13/2023 4.88  4.14 - 5.80 10*6/mm3 Final    Hemoglobin 09/13/2023 15.0  13.0 - 17.7 g/dL Final     Hematocrit 09/13/2023 45.6  37.5 - 51.0 % Final    MCV 09/13/2023 93.4  79.0 - 97.0 fL Final    MCH 09/13/2023 30.7  26.6 - 33.0 pg Final    MCHC 09/13/2023 32.9  31.5 - 35.7 g/dL Final    RDW 09/13/2023 12.5  12.3 - 15.4 % Final    RDW-SD 09/13/2023 43.3  37.0 - 54.0 fl Final    MPV 09/13/2023 11.9  6.0 - 12.0 fL Final    Platelets 09/13/2023 186  140 - 450 10*3/mm3 Final    Neutrophil % 09/13/2023 54.6  42.7 - 76.0 % Final    Lymphocyte % 09/13/2023 28.0  19.6 - 45.3 % Final    Monocyte % 09/13/2023 11.2  5.0 - 12.0 % Final    Eosinophil % 09/13/2023 5.2  0.3 - 6.2 % Final    Basophil % 09/13/2023 0.8  0.0 - 1.5 % Final    Immature Grans % 09/13/2023 0.2  0.0 - 0.5 % Final    Neutrophils, Absolute 09/13/2023 2.82  1.70 - 7.00 10*3/mm3 Final    Lymphocytes, Absolute 09/13/2023 1.45  0.70 - 3.10 10*3/mm3 Final    Monocytes, Absolute 09/13/2023 0.58  0.10 - 0.90 10*3/mm3 Final    Eosinophils, Absolute 09/13/2023 0.27  0.00 - 0.40 10*3/mm3 Final    Basophils, Absolute 09/13/2023 0.04  0.00 - 0.20 10*3/mm3 Final    Immature Grans, Absolute 09/13/2023 0.01  0.00 - 0.05 10*3/mm3 Final    nRBC 09/13/2023 0.0  0.0 - 0.2 /100 WBC Final        Procedure:       Assessment/Plan:   Ureteral calculus-patient has been diagnosed with a ureteral calculus.  We have discussed the various parameters regarding spontaneous passage including the notion that a 2 mm stone has a high likelihood of spontaneous passage versus a larger stone being caught up in the upper areas of the urinary tract.  We also discussed the medical management of stone disease and the use of medical expulsive therapy in the form of Flomax.  This is used in an off label setting.  We discussed the indicators for intervention including  absolute indicators such as sepsis and uncontrollable severe pain, as well as the relative indicators of moderate pain that is well-controlled with various analgesia.  I also talked about nonoperative management including ambulation  and increasing fluids and hot tub as being an effective adjuncts in the treatment of a ureteral stone.  He has passed several stones  Family history of prostate cancer-he has a family history of prostate cancer.  I discussed with him the significant indications as to what this means.  The generally expected incidence of prostate cancer in the population is approximately 14%.  However, in first-degree relatives such as father or brother, it doubles at risk and doubles again with more first-degree relatives.  A second-degree relative does not make it go up that much.  This is also true of breast and ovarian cancer.  Elevated prostate specific antigen-we discussed the diagnosis of elevated prostate-specific antigen.  I explained the pathophysiology of PSA.  It is a serine protease that's function in the male reproductive tract is to facilitate the liquefaction of semen.  It is for this reason the body does not want it freely floating in the serum and why typically bound tightly to albumin.  We discussed why we used both a PSA free and total to determine the need for more aggressive therapy. I discussed the normal range.  Additionally, it was in the range of 1 to 4, but more recently has been downgraded to something less than 2 or even approaching 1.  I discussed the risk of family history, particularly the fact that the average male has a 14% risk of prostate cancer and that in the face of a positive diagnosis in a father it will tablet and any other first-generation relative continued tablet insofar that a father and brother with prostate cancer will produce almost a 50% risk of prostate cancer.  I discussed the use of the temporal use of PSA as the best option for monitoring.  We also discussed the fact that an elevated PSA is an isolated event does not mean that this is prostate cancer and should not engender worry in this regard. I discussed other things that can elevate PSA including constipation, prostatitis,  infection, recent intercourse etc., as well as the risks and benefits associated with this.  Also discussed the fact that this is with a dilutional test and as a consequence of such were present produce slight variations on a single specimen.  Further discussed the risks and benefits of a prostate biopsy as well.  We will get an MRI scan          This document has been electronically signed by JOSE M IBANEZ MD October 12, 2023 15:00 EDT    Dictated Utilizing Dragon Dictation: Part of this note may be an electronic transcription/translation of spoken language to printed text using the Dragon Dictation System.

## 2023-11-01 ENCOUNTER — HOSPITAL ENCOUNTER (OUTPATIENT)
Dept: MRI IMAGING | Facility: HOSPITAL | Age: 58
Discharge: HOME | End: 2023-11-01
Admitting: UROLOGY
Payer: COMMERCIAL

## 2023-11-01 DIAGNOSIS — Z80.42 FAMILY HISTORY OF PROSTATE CANCER IN FATHER: ICD-10-CM

## 2023-11-01 LAB — CREAT BLDA-MCNC: 1.3 MG/DL (ref 0.6–1.3)

## 2023-11-01 PROCEDURE — 0 GADOBENATE DIMEGLUMINE 529 MG/ML SOLUTION: Performed by: UROLOGY

## 2023-11-01 PROCEDURE — 72197 MRI PELVIS W/O & W/DYE: CPT

## 2023-11-01 PROCEDURE — A9577 INJ MULTIHANCE: HCPCS | Performed by: UROLOGY

## 2023-11-01 PROCEDURE — 82565 ASSAY OF CREATININE: CPT

## 2023-11-01 RX ADMIN — GADOBENATE DIMEGLUMINE 14 ML: 529 INJECTION, SOLUTION INTRAVENOUS at 13:47

## 2023-11-02 ENCOUNTER — TELEPHONE (OUTPATIENT)
Dept: UROLOGY | Facility: CLINIC | Age: 58
End: 2023-11-02
Payer: COMMERCIAL

## 2023-11-02 NOTE — TELEPHONE ENCOUNTER
I called the pt and let him know his results and I scheduled him for his 6 mth follow up    ----- Message from Alcides Storey MD sent at 11/2/2023  2:22 PM EDT -----  Notify PI-RADS 3 is what we consider a good result then we do not need a biopsy just to follow-up in 6 months  ----- Message -----  From: Oksana, Rad Results [place] In  Sent: 11/2/2023   2:08 PM EDT  To: Alcides Storey MD

## 2024-04-05 DIAGNOSIS — R97.20 ELEVATED PROSTATE SPECIFIC ANTIGEN (PSA): ICD-10-CM

## 2024-04-08 RX ORDER — FINASTERIDE 5 MG/1
5 TABLET, FILM COATED ORAL DAILY
Qty: 90 TABLET | Refills: 3 | Status: SHIPPED | OUTPATIENT
Start: 2024-04-08

## 2024-05-09 ENCOUNTER — OFFICE VISIT (OUTPATIENT)
Dept: UROLOGY | Facility: CLINIC | Age: 59
End: 2024-05-09
Payer: COMMERCIAL

## 2024-05-09 VITALS — BODY MASS INDEX: 23.28 KG/M2 | HEIGHT: 69 IN | WEIGHT: 157.2 LBS

## 2024-05-09 DIAGNOSIS — Z80.42 FAMILY HISTORY OF PROSTATE CANCER IN FATHER: ICD-10-CM

## 2024-05-09 DIAGNOSIS — N20.1 RIGHT URETERAL CALCULUS: ICD-10-CM

## 2024-05-09 DIAGNOSIS — R97.20 ELEVATED PROSTATE SPECIFIC ANTIGEN (PSA): Primary | ICD-10-CM

## 2024-05-09 LAB — PSA SERPL-MCNC: 3.99 NG/ML (ref 0–4)

## 2024-05-09 PROCEDURE — 84153 ASSAY OF PSA TOTAL: CPT | Performed by: UROLOGY

## 2024-11-12 ENCOUNTER — OFFICE VISIT (OUTPATIENT)
Dept: UROLOGY | Facility: CLINIC | Age: 59
End: 2024-11-12
Payer: COMMERCIAL

## 2024-11-12 VITALS
DIASTOLIC BLOOD PRESSURE: 74 MMHG | BODY MASS INDEX: 23.55 KG/M2 | SYSTOLIC BLOOD PRESSURE: 129 MMHG | HEIGHT: 69 IN | WEIGHT: 159 LBS | HEART RATE: 74 BPM

## 2024-11-12 DIAGNOSIS — N28.1 RENAL CYST: ICD-10-CM

## 2024-11-12 DIAGNOSIS — R97.20 ELEVATED PROSTATE SPECIFIC ANTIGEN (PSA): Primary | ICD-10-CM

## 2024-11-12 DIAGNOSIS — Z80.42 FAMILY HISTORY OF PROSTATE CANCER IN FATHER: ICD-10-CM

## 2024-11-12 DIAGNOSIS — N20.1 RIGHT URETERAL CALCULUS: ICD-10-CM

## 2024-11-12 LAB — PSA SERPL-MCNC: 4.41 NG/ML (ref 0–4)

## 2024-11-12 PROCEDURE — 84153 ASSAY OF PSA TOTAL: CPT | Performed by: UROLOGY

## 2024-11-12 NOTE — PROGRESS NOTES
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  His PSA is 3.99 now he is on 5 alpha reductase inhibition he is not had any more ureteral stones and he has a PI-RADS 3 lesion seen on CT I would have him continue his current course of therapy I watch him closely I will see him back in 6 months.    Past Medical History:     Past Medical History:   Diagnosis Date    Arthritis     Benign prostatic hyperplasia 04/2018    Elevated cholesterol     Elevated PSA 07/2019    Hyperlipidemia     Hypertension     Kidney stone 2004    Polycystic kidney disease 04/2018    Renal cyst        Current Meds:     Current Outpatient Medications   Medication Sig Dispense Refill    doxepin (SINEquan) 10 MG capsule Take 1 capsule by mouth every night at bedtime. 90 capsule 1    doxycycline (MONODOX) 100 MG capsule Take 1 capsule by mouth 2 times per day as needed following tick bite 20 capsule 0    finasteride (PROSCAR) 5 MG tablet Take 1 tablet by mouth Daily. 90 tablet 3    ketorolac (TORADOL) 10 MG tablet Take 1 tablet by mouth Every 6 (Six) Hours As Needed for Moderate Pain. 16 tablet 2    lisinopril-hydrochlorothiazide (PRINZIDE,ZESTORETIC) 20-12.5 MG per tablet Take 1 tablet by mouth daily. 90 tablet 3    lisinopril-hydrochlorothiazide (PRINZIDE,ZESTORETIC) 20-12.5 MG per tablet Take 1 tablet by mouth once daily 90 tablet 3    lisinopril-hydrochlorothiazide (PRINZIDE,ZESTORETIC) 20-12.5 MG per tablet Take 1 tablet by mouth Daily. 90 tablet 3    meloxicam (MOBIC) 15 MG tablet Take 1 tablet by mouth Daily. 90 tablet 3    meloxicam (MOBIC) 15 MG tablet Take 1 tablet by mouth Daily. 90 tablet 3    meloxicam (MOBIC) 15 MG tablet Take 1 tablet (15 mg) by mouth once daily. 90 tablet 3    ondansetron (Zofran) 4 MG tablet Take 1 tablet by mouth Daily As Needed for Nausea or Vomiting. 30 tablet 1    potassium citrate (UROCIT-K) 10 MEQ (1080 MG) CR  tablet Take 1 tablet by mouth twice daily 180 tablet 3    potassium citrate (UROCIT-K) 10 MEQ (1080 MG) CR tablet Take 1 tablet by mouth 2 (Two) Times a Day. 180 tablet 3    propranolol (INDERAL) 40 MG tablet Take 1 tablet by mouth Daily. (Patient taking differently: Take 40 mg by mouth As Needed.) 30 tablet 3    propranolol (INDERAL) 40 MG tablet Take 1 tablet by mouth daily 30 tablet 3    rosuvastatin (Crestor) 40 MG tablet Take 1 tablet by mouth daily. 90 tablet 3    rosuvastatin (Crestor) 40 MG tablet Take 1 tablet by mouth daily 90 tablet 3    rosuvastatin (Crestor) 40 MG tablet Take 1 tablet by mouth Daily. 90 tablet 3    sodium-potassium-magnesium sulfates (SUPREP) 17.5-3.13-1.6 GM/177ML solution oral solution TAKE BY MOUTH AS DIRECTED 354 mL 0    tamsulosin (Flomax) 0.4 MG capsule 24 hr capsule Take 1 capsule by mouth Every Night. 30 capsule 5    valACYclovir (Valtrex) 1000 MG tablet Take 2 tablets by mouth twice daily x 1 day 28 tablet 1     No current facility-administered medications for this visit.        Allergies:      No Known Allergies     Past Surgical History:     Past Surgical History:   Procedure Laterality Date    COLONOSCOPY      COLONOSCOPY N/A 8/24/2022    Procedure: COLONOSCOPY;  Surgeon: Miguel A Tidwell MD;  Location: [facility];  Service: Gastroenterology;  Laterality: N/A;    JOINT REPLACEMENT      LITHOTRIPSY      VASECTOMY  2003       Social History:     Social History     Socioeconomic History    Marital status:    Tobacco Use    Smoking status: Never    Smokeless tobacco: Never   Vaping Use    Vaping status: Never Used   Substance and Sexual Activity    Alcohol use: No    Drug use: No    Sexual activity: Yes     Partners: Female     Birth control/protection: Surgical       Family History:     Family History   Problem Relation Age of Onset    Cancer Father         Bladder ca    Prostate cancer Father     Hypertension Mother        Review of Systems:     Review of Systems    Constitutional: Negative.    HENT: Negative.     Eyes: Negative.    Respiratory: Negative.     Cardiovascular: Negative.    Gastrointestinal: Negative.    Endocrine: Negative.    Musculoskeletal: Negative.    Allergic/Immunologic: Negative.    Neurological: Negative.    Hematological: Negative.    Psychiatric/Behavioral: Negative.         Physical Exam:     Physical Exam  Vitals and nursing note reviewed.   Constitutional:       Appearance: He is well-developed.   HENT:      Head: Normocephalic and atraumatic.   Eyes:      Conjunctiva/sclera: Conjunctivae normal.      Pupils: Pupils are equal, round, and reactive to light.   Cardiovascular:      Rate and Rhythm: Normal rate and regular rhythm.      Heart sounds: Normal heart sounds.   Pulmonary:      Effort: Pulmonary effort is normal.      Breath sounds: Normal breath sounds.   Abdominal:      General: Bowel sounds are normal.      Palpations: Abdomen is soft.   Genitourinary:     Prostate: Normal.   Musculoskeletal:         General: Normal range of motion.      Cervical back: Normal range of motion.   Skin:     General: Skin is warm and dry.   Neurological:      Mental Status: He is alert and oriented to person, place, and time.      Deep Tendon Reflexes: Reflexes are normal and symmetric.   Psychiatric:         Behavior: Behavior normal.         Thought Content: Thought content normal.         Judgment: Judgment normal.         I have reviewed the following portions of the patient's history: Allergies, current medications, past family history, past medical history, past social history, past surgical history, problem list, and ROS and confirm it is accurate.    Recent Image (CT and/or KUB):      CT Abdomen and Pelvis: No results found for this or any previous visit.       CT Stone Protocol: Results for orders placed during the hospital encounter of 02/24/20    CT Abdomen Pelvis Stone Protocol    Narrative  EXAM: CT ABDOMEN PELVIS STONE  PROTOCOL-        TECHNIQUE: Multiple axial CT images were obtained from lung bases  through pubic symphysis WITHOUT administration of IV contrast.  Reformatted images in the coronal and/or sagittal plane(s) were  generated from the axial data set to facilitate diagnostic accuracy  and/or surgical planning.  Oral Contrast:NONE.    Radiation dose reduction techniques were utilized per ALARA protocol.  Automated exposure control was initiated through either or [employer] or  D[employer]Right software packages by  protocol.    DOSE:    Clinical information  Flank pain, stone disease suspected    Comparison  Comparison: 04/02/2018    Findings  LOWER THORAX: STABLE CENTRILOBULAR NODULES IN THE RIGHT LOWER LOBE  LIKELY POST ATYPICAL INFECTIOUS IN ETIOLOGY.    ABDOMEN:    LIVER: STABLE CYST VERSUS HEMANGIOMA RIGHT LOBE OF LIVER.    GALLBLADDER: CHOLELITHIASIS NOTED.    PANCREAS: Unremarkable. No mass or ductal dilatation.    SPLEEN: Homogeneous. No splenomegaly.    ADRENALS: No mass.    KIDNEYS/URETERS: 6 MM PROXIMAL RIGHT URETERAL STONE JUST BEYOND THE  UPJ CAUSING MODERATE RIGHT HYDRONEPHROSIS. MULTIPLE BILATERAL RIGHT  GREATER THAN LEFT NONOBSTRUCTING KIDNEY STONES ARE NOTED. BILATERAL  SIMPLE APPEARING RENAL CYSTS.    GI TRACT: MODERATE CONSTIPATION. NO BOWEL OBSTRUCTION.    PERITONEUM: No free air. No free fluid or loculated fluid  collections.    MESENTERY: Unremarkable.    LYMPH NODES: No lymphadenopathy.    VASCULATURE: No evidence of aneurysm.    ABDOMINAL WALL: No focal hernia or mass.      OTHER: None.    PELVIS:    BLADDER: No focal mass or significant wall thickening    REPRODUCTIVE: PROSTATE ENLARGEMENT WITH PROSTATE CALCIFICATIONS.    APPENDIX: APPENDIX IS WITHIN NORMAL LIMITS.    BONES: No acute bony abnormality.    Impression  Impression:  1. 6 mm right proximal ureteral stone causing moderate hydronephrosis.  2. Bilateral nonobstructing kidney stones right greater than left.  3. Moderate  constipation.  4. Bilateral renal cysts.  5. Cholelithiasis.  6. Other findings as above which appear stable.    This report was finalized on 2/24/2020 9:01 AM by Dr. Steven Abraham MD.       KUB: Results for orders placed in visit on 05/11/20    XR Abdomen KUB    Narrative  EXAMINATION: XR ABDOMEN KUB-    CLINICAL INDICATION: ABD PAIN; R10.9-Unspecified abdominal pain      COMPARISON: 02/24/2020    FINDINGS: One view of the abdomen demonstrates large volume stool in the  colon    Left-sided calcifications, some of which may represent left-sided  lithiasis.    No evidence of bowel obstruction    This report was finalized on 5/12/2020 7:59 AM by Dr. Valeriy Medellin MD.       Labs (past 3 months):      No visits with results within 3 Month(s) from this visit.   Latest known visit with results is:   Office Visit on 05/09/2024   Component Date Value Ref Range Status    PSA 05/09/2024 3.990  0.000 - 4.000 ng/mL Final        Procedure:       Assessment/Plan:   Elevated prostate specific antigen-we discussed the diagnosis of elevated prostate-specific antigen.  I explained the pathophysiology of PSA.  It is a serine protease that's function in the male reproductive tract is to facilitate the liquefaction of semen.  It is for this reason the body does not want it freely floating in the serum and why typically bound tightly to albumin.  We discussed why we used both a PSA free and total to determine the need for more aggressive therapy. I discussed the normal range.  Additionally, it was in the range of 1 to 4, but more recently has been downgraded to something less than 2 or even approaching 1.  I discussed the risk of family history, particularly the fact that the average male has a 14% risk of prostate cancer and that in the face of a positive diagnosis in a father it will tablet and any other first-generation relative continued tablet insofar that a father and brother with prostate cancer will produce almost a 50% risk of  prostate cancer.  I discussed the use of the temporal use of PSA as the best option for monitoring.  We also discussed the fact that an elevated PSA is an isolated event does not mean that this is prostate cancer and should not engender worry in this regard. I discussed other things that can elevate PSA including constipation, prostatitis, infection, recent intercourse etc., as well as the risks and benefits associated with this.  Also discussed the fact that this is with a dilutional test and as a consequence of such were present produce slight variations on a single specimen.  Further discussed the risks and benefits of a prostate biopsy as well.  Continue finasteride he has a PI-RADS 3 lesion seen on MRI.  Ureteral calculus-patient has been diagnosed with a ureteral calculus.  We have discussed the various parameters regarding spontaneous passage including the notion that a 2 mm stone has a high likelihood of spontaneous passage versus a larger stone being caught up in the upper areas of the urinary tract.  We also discussed the medical management of stone disease and the use of medical expulsive therapy in the form of Flomax.  This is used in an off label setting.  We discussed the indicators for intervention including  absolute indicators such as sepsis and uncontrollable severe pain, as well as the relative indicators of moderate pain that is well-controlled with various analgesia.  I also talked about nonoperative management including ambulation and increasing fluids and hot tub as being an effective adjuncts in the treatment of a ureteral stone.  Currently free of stones            This document has been electronically signed by JOSE M IBANEZ MD November 12, 2024 08:58 EST    Dictated Utilizing Dragon Dictation: Part of this note may be an electronic transcription/translation of spoken language to printed text using the Dragon Dictation System.

## 2024-11-13 ENCOUNTER — TELEPHONE (OUTPATIENT)
Dept: UROLOGY | Facility: CLINIC | Age: 59
End: 2024-11-13
Payer: COMMERCIAL

## 2024-11-13 NOTE — TELEPHONE ENCOUNTER
I called and let the pt know that his PSA was good      ----- Message from Alcides Storey sent at 11/13/2024  6:21 AM EST -----  Notify good result  ----- Message -----  From: Lab, Background User  Sent: 11/12/2024   9:27 PM EST  To: Alcides Storey MD

## 2025-05-13 DIAGNOSIS — R97.20 ELEVATED PROSTATE SPECIFIC ANTIGEN (PSA): ICD-10-CM

## 2025-05-13 RX ORDER — FINASTERIDE 5 MG/1
5 TABLET, FILM COATED ORAL DAILY
Qty: 90 TABLET | Refills: 3 | Status: SHIPPED | OUTPATIENT
Start: 2025-05-13

## 2025-07-01 ENCOUNTER — TRANSCRIBE ORDERS (OUTPATIENT)
Dept: ADMINISTRATIVE | Facility: HOSPITAL | Age: 60
End: 2025-07-01
Payer: COMMERCIAL

## 2025-07-01 ENCOUNTER — LAB (OUTPATIENT)
Dept: LAB | Facility: HOSPITAL | Age: 60
End: 2025-07-01
Payer: COMMERCIAL

## 2025-07-01 DIAGNOSIS — I10 ESSENTIAL (PRIMARY) HYPERTENSION: ICD-10-CM

## 2025-07-01 DIAGNOSIS — I10 ESSENTIAL (PRIMARY) HYPERTENSION: Primary | ICD-10-CM

## 2025-07-01 LAB
BASOPHILS # BLD AUTO: 0.04 10*3/MM3 (ref 0–0.2)
BASOPHILS NFR BLD AUTO: 0.6 % (ref 0–1.5)
DEPRECATED RDW RBC AUTO: 44 FL (ref 37–54)
EOSINOPHIL # BLD AUTO: 0.39 10*3/MM3 (ref 0–0.4)
EOSINOPHIL NFR BLD AUTO: 5.7 % (ref 0.3–6.2)
ERYTHROCYTE [DISTWIDTH] IN BLOOD BY AUTOMATED COUNT: 12.7 % (ref 12.3–15.4)
HCT VFR BLD AUTO: 44.1 % (ref 37.5–51)
HGB BLD-MCNC: 14.5 G/DL (ref 13–17.7)
IMM GRANULOCYTES # BLD AUTO: 0.01 10*3/MM3 (ref 0–0.05)
IMM GRANULOCYTES NFR BLD AUTO: 0.1 % (ref 0–0.5)
LYMPHOCYTES # BLD AUTO: 1.95 10*3/MM3 (ref 0.7–3.1)
LYMPHOCYTES NFR BLD AUTO: 28.7 % (ref 19.6–45.3)
MCH RBC QN AUTO: 30.7 PG (ref 26.6–33)
MCHC RBC AUTO-ENTMCNC: 32.9 G/DL (ref 31.5–35.7)
MCV RBC AUTO: 93.2 FL (ref 79–97)
MONOCYTES # BLD AUTO: 0.62 10*3/MM3 (ref 0.1–0.9)
MONOCYTES NFR BLD AUTO: 9.1 % (ref 5–12)
NEUTROPHILS NFR BLD AUTO: 3.79 10*3/MM3 (ref 1.7–7)
NEUTROPHILS NFR BLD AUTO: 55.8 % (ref 42.7–76)
NRBC BLD AUTO-RTO: 0 /100 WBC (ref 0–0.2)
PLATELET # BLD AUTO: 171 10*3/MM3 (ref 140–450)
PMV BLD AUTO: 11.7 FL (ref 6–12)
RBC # BLD AUTO: 4.73 10*6/MM3 (ref 4.14–5.8)
WBC NRBC COR # BLD AUTO: 6.8 10*3/MM3 (ref 3.4–10.8)

## 2025-07-01 PROCEDURE — 80050 GENERAL HEALTH PANEL: CPT

## 2025-07-01 PROCEDURE — 36415 COLL VENOUS BLD VENIPUNCTURE: CPT

## 2025-07-01 PROCEDURE — 80061 LIPID PANEL: CPT

## 2025-07-02 LAB
ALBUMIN SERPL-MCNC: 4.3 G/DL (ref 3.5–5.2)
ALBUMIN/GLOB SERPL: 1.6 G/DL
ALP SERPL-CCNC: 53 U/L (ref 39–117)
ALT SERPL W P-5'-P-CCNC: 23 U/L (ref 1–41)
ANION GAP SERPL CALCULATED.3IONS-SCNC: 11 MMOL/L (ref 5–15)
AST SERPL-CCNC: 34 U/L (ref 1–40)
BILIRUB SERPL-MCNC: 0.4 MG/DL (ref 0–1.2)
BUN SERPL-MCNC: 22.5 MG/DL (ref 8–23)
BUN/CREAT SERPL: 17.9 (ref 7–25)
CALCIUM SPEC-SCNC: 9 MG/DL (ref 8.6–10.5)
CHLORIDE SERPL-SCNC: 103 MMOL/L (ref 98–107)
CHOLEST SERPL-MCNC: 189 MG/DL (ref 0–200)
CO2 SERPL-SCNC: 26 MMOL/L (ref 22–29)
CREAT SERPL-MCNC: 1.26 MG/DL (ref 0.76–1.27)
EGFRCR SERPLBLD CKD-EPI 2021: 64.9 ML/MIN/1.73
GLOBULIN UR ELPH-MCNC: 2.7 GM/DL
GLUCOSE SERPL-MCNC: 90 MG/DL (ref 65–99)
HDLC SERPL-MCNC: 53 MG/DL (ref 40–60)
LDLC SERPL CALC-MCNC: 110 MG/DL (ref 0–100)
LDLC/HDLC SERPL: 2 {RATIO}
POTASSIUM SERPL-SCNC: 4.2 MMOL/L (ref 3.5–5.2)
PROT SERPL-MCNC: 7 G/DL (ref 6–8.5)
SODIUM SERPL-SCNC: 140 MMOL/L (ref 136–145)
TRIGL SERPL-MCNC: 151 MG/DL (ref 0–150)
TSH SERPL DL<=0.05 MIU/L-ACNC: 2.39 UIU/ML (ref 0.27–4.2)
VLDLC SERPL-MCNC: 26 MG/DL (ref 5–40)

## 2025-07-17 ENCOUNTER — OFFICE VISIT (OUTPATIENT)
Dept: UROLOGY | Facility: CLINIC | Age: 60
End: 2025-07-17
Payer: COMMERCIAL

## 2025-07-17 VITALS
DIASTOLIC BLOOD PRESSURE: 88 MMHG | HEART RATE: 89 BPM | WEIGHT: 159 LBS | HEIGHT: 69 IN | BODY MASS INDEX: 23.55 KG/M2 | SYSTOLIC BLOOD PRESSURE: 129 MMHG

## 2025-07-17 DIAGNOSIS — R97.20 ELEVATED PROSTATE SPECIFIC ANTIGEN (PSA): ICD-10-CM

## 2025-07-17 PROCEDURE — 84153 ASSAY OF PSA TOTAL: CPT

## 2025-07-17 PROCEDURE — 84154 ASSAY OF PSA FREE: CPT

## 2025-07-17 NOTE — PROGRESS NOTES
"Chief Complaint:    Chief Complaint   Patient presents with    Elevated PSA     6 month fu        Vital Signs:   /88 (BP Location: Right arm, Patient Position: Sitting, Cuff Size: Adult)   Pulse 89   Ht 175.3 cm (69.02\")   Wt 72.1 kg (159 lb)   BMI 23.47 kg/m²   Body mass index is 23.47 kg/m².      HPI:  Bill Centeno is a 59 y.o. male who presents today for follow up    History of Present Illness  Mr. Centeno presents to the clinic today for follow-up for an elevated PSA.  He has been following along routinely with Dr. Storey and has a past medical history of a PSA that was high as 6.42 in March 2022.  He does have a known family history of prostate cancer he was started finasteride and had an overall decrease in his PSA to 2.6 in February 2024.  Over the past 18 months his PSA has fluctuated from 4.05 in September 23, 3.99 in May 2024, and 4.4 in November 2024.  He has not had a PSA since.  He does continue on finasteride.  He has a past medical history of kidney stones for which he takes Flomax as needed but denies any complaints at this time.  He did have a previous MRI in 2023 that showed a PI-RADS 3 lesion in the left peripheral zone      Past Medical History:  Past Medical History:   Diagnosis Date    Arthritis     Benign prostatic hyperplasia 04/2018    Elevated cholesterol     Elevated PSA 07/2019    Hyperlipidemia     Hypertension     Kidney stone 2004    Polycystic kidney disease 04/2018    Renal cyst        Current Meds:  Current Outpatient Medications   Medication Sig Dispense Refill    doxepin (SINEquan) 10 MG capsule Take 1 capsule by mouth every night at bedtime. 90 capsule 0    doxycycline (MONODOX) 100 MG capsule Take 1 capsule by mouth 2 times per day as needed following tick bite 20 capsule 0    finasteride (PROSCAR) 5 MG tablet Take 1 tablet by mouth Daily. 90 tablet 3    ketorolac (TORADOL) 10 MG tablet Take 1 tablet by mouth Every 6 (Six) Hours As Needed for Moderate Pain. 16 tablet 2 "    lisinopril-hydrochlorothiazide (PRINZIDE,ZESTORETIC) 20-12.5 MG per tablet Take 1 tablet by mouth Daily. 90 tablet 3    meloxicam (MOBIC) 15 MG tablet Take 1 tablet by mouth Daily. 90 tablet 3    meloxicam (MOBIC) 15 MG tablet Take 1 tablet by mouth Daily. 90 tablet 3    ondansetron (Zofran) 4 MG tablet Take 1 tablet by mouth Daily As Needed for Nausea or Vomiting. 30 tablet 1    potassium citrate (UROCIT-K) 10 MEQ (1080 MG) CR tablet Take 1 tablet by mouth 2 (Two) Times a Day. 180 tablet 3    propranolol (INDERAL) 40 MG tablet Take 1 tablet by mouth daily 30 tablet 3    rosuvastatin (Crestor) 40 MG tablet Take 1 tablet by mouth Daily. 90 tablet 3    tamsulosin (Flomax) 0.4 MG capsule 24 hr capsule Take 1 capsule by mouth Every Night. 30 capsule 5     No current facility-administered medications for this visit.        Allergies:   No Known Allergies     Past Surgical History:  Past Surgical History:   Procedure Laterality Date    COLONOSCOPY      COLONOSCOPY N/A 8/24/2022    Procedure: COLONOSCOPY;  Surgeon: Miguel A Tidwell MD;  Location: [facility];  Service: Gastroenterology;  Laterality: N/A;    JOINT REPLACEMENT      LITHOTRIPSY      VASECTOMY  2003       Social History:  Social History     Socioeconomic History    Marital status:    Tobacco Use    Smoking status: Never    Smokeless tobacco: Never   Vaping Use    Vaping status: Never Used   Substance and Sexual Activity    Alcohol use: No    Drug use: No    Sexual activity: Yes     Partners: Female     Birth control/protection: Surgical       Family History:  Family History   Problem Relation Age of Onset    Cancer Father         Bladder ca    Prostate cancer Father     Hypertension Mother        Review of Systems:  Review of Systems   Constitutional:  Negative for chills, fatigue and fever.   HENT:  Negative for congestion and sinus pressure.    Respiratory:  Negative for chest tightness and shortness of breath.    Cardiovascular:  Negative for  chest pain.   Gastrointestinal:  Negative for abdominal pain, constipation, diarrhea, nausea and vomiting.   Genitourinary:  Negative for flank pain, frequency, hematuria and urgency.   Musculoskeletal:  Negative for back pain and neck pain.   Skin:  Negative for rash.   Neurological:  Negative for dizziness and headaches.   Hematological:  Does not bruise/bleed easily.   Psychiatric/Behavioral:  The patient is not nervous/anxious.        Physical Exam:  Physical Exam  Constitutional:       General: He is not in acute distress.     Appearance: Normal appearance.   HENT:      Head: Normocephalic and atraumatic.      Nose: Nose normal.      Mouth/Throat:      Mouth: Mucous membranes are moist.   Eyes:      Conjunctiva/sclera: Conjunctivae normal.   Cardiovascular:      Rate and Rhythm: Normal rate.      Pulses: Normal pulses.   Pulmonary:      Effort: Pulmonary effort is normal.   Abdominal:      Palpations: Abdomen is soft.   Musculoskeletal:         General: Normal range of motion.      Cervical back: Normal range of motion.   Skin:     General: Skin is warm.   Neurological:      General: No focal deficit present.      Mental Status: He is alert and oriented to person, place, and time.   Psychiatric:         Mood and Affect: Mood normal.         Behavior: Behavior normal.         Thought Content: Thought content normal.         Judgment: Judgment normal.     Recent Image (CT and/or KUB):   CT Abdomen and Pelvis: No results found for this or any previous visit.     CT Stone Protocol: Results for orders placed during the hospital encounter of 02/24/20    CT Abdomen Pelvis Stone Protocol    Narrative  EXAM: CT ABDOMEN PELVIS STONE PROTOCOL-        TECHNIQUE: Multiple axial CT images were obtained from lung bases  through pubic symphysis WITHOUT administration of IV contrast.  Reformatted images in the coronal and/or sagittal plane(s) were  generated from the axial data set to facilitate diagnostic accuracy  and/or  surgical planning.  Oral Contrast:NONE.    Radiation dose reduction techniques were utilized per ALARA protocol.  Automated exposure control was initiated through either or [employer] or  [email or web address] software packages by  protocol.    DOSE:    Clinical information  Flank pain, stone disease suspected    Comparison  Comparison: 04/02/2018    Findings  LOWER THORAX: STABLE CENTRILOBULAR NODULES IN THE RIGHT LOWER LOBE  LIKELY POST ATYPICAL INFECTIOUS IN ETIOLOGY.    ABDOMEN:    LIVER: STABLE CYST VERSUS HEMANGIOMA RIGHT LOBE OF LIVER.    GALLBLADDER: CHOLELITHIASIS NOTED.    PANCREAS: Unremarkable. No mass or ductal dilatation.    SPLEEN: Homogeneous. No splenomegaly.    ADRENALS: No mass.    KIDNEYS/URETERS: 6 MM PROXIMAL RIGHT URETERAL STONE JUST BEYOND THE  UPJ CAUSING MODERATE RIGHT HYDRONEPHROSIS. MULTIPLE BILATERAL RIGHT  GREATER THAN LEFT NONOBSTRUCTING KIDNEY STONES ARE NOTED. BILATERAL  SIMPLE APPEARING RENAL CYSTS.    GI TRACT: MODERATE CONSTIPATION. NO BOWEL OBSTRUCTION.    PERITONEUM: No free air. No free fluid or loculated fluid  collections.    MESENTERY: Unremarkable.    LYMPH NODES: No lymphadenopathy.    VASCULATURE: No evidence of aneurysm.    ABDOMINAL WALL: No focal hernia or mass.      OTHER: None.    PELVIS:    BLADDER: No focal mass or significant wall thickening    REPRODUCTIVE: PROSTATE ENLARGEMENT WITH PROSTATE CALCIFICATIONS.    APPENDIX: APPENDIX IS WITHIN NORMAL LIMITS.    BONES: No acute bony abnormality.    Impression  Impression:  1. 6 mm right proximal ureteral stone causing moderate hydronephrosis.  2. Bilateral nonobstructing kidney stones right greater than left.  3. Moderate constipation.  4. Bilateral renal cysts.  5. Cholelithiasis.  6. Other findings as above which appear stable.    This report was finalized on 2/24/2020 9:01 AM by Dr. Steven Abraham MD.     KUB: Results for orders placed in visit on 05/11/20    XR Abdomen KUB    Narrative  EXAMINATION: XR ABDOMEN  KUB-    CLINICAL INDICATION: ABD PAIN; R10.9-Unspecified abdominal pain      COMPARISON: 02/24/2020    FINDINGS: One view of the abdomen demonstrates large volume stool in the  colon    Left-sided calcifications, some of which may represent left-sided  lithiasis.    No evidence of bowel obstruction    This report was finalized on 5/12/2020 7:59 AM by Dr. Valeriy Medellin MD.       Labs:  Brief Urine Lab Results       None          Lab on 07/01/2025   Component Date Value Ref Range Status    Glucose 07/01/2025 90  65 - 99 mg/dL Final    BUN 07/01/2025 22.5  8.0 - 23.0 mg/dL Final    Creatinine 07/01/2025 1.26  0.76 - 1.27 mg/dL Final    Sodium 07/01/2025 140  136 - 145 mmol/L Final    Potassium 07/01/2025 4.2  3.5 - 5.2 mmol/L Final    Chloride 07/01/2025 103  98 - 107 mmol/L Final    CO2 07/01/2025 26.0  22.0 - 29.0 mmol/L Final    Calcium 07/01/2025 9.0  8.6 - 10.5 mg/dL Final    Total Protein 07/01/2025 7.0  6.0 - 8.5 g/dL Final    Albumin 07/01/2025 4.3  3.5 - 5.2 g/dL Final    ALT (SGPT) 07/01/2025 23  1 - 41 U/L Final    AST (SGOT) 07/01/2025 34  1 - 40 U/L Final    Alkaline Phosphatase 07/01/2025 53  39 - 117 U/L Final    Total Bilirubin 07/01/2025 0.4  0.0 - 1.2 mg/dL Final    Globulin 07/01/2025 2.7  gm/dL Final    A/G Ratio 07/01/2025 1.6  g/dL Final    BUN/Creatinine Ratio 07/01/2025 17.9  7.0 - 25.0 Final    Anion Gap 07/01/2025 11.0  5.0 - 15.0 mmol/L Final    eGFR 07/01/2025 64.9  >60.0 mL/min/1.73 Final    Total Cholesterol 07/01/2025 189  0 - 200 mg/dL Final    Triglycerides 07/01/2025 151 (H)  0 - 150 mg/dL Final    HDL Cholesterol 07/01/2025 53  40 - 60 mg/dL Final    LDL Cholesterol  07/01/2025 110 (H)  0 - 100 mg/dL Final    VLDL Cholesterol 07/01/2025 26  5 - 40 mg/dL Final    LDL/HDL Ratio 07/01/2025 2.00   Final    TSH 07/01/2025 2.390  0.270 - 4.200 uIU/mL Final    WBC 07/01/2025 6.80  3.40 - 10.80 10*3/mm3 Final    RBC 07/01/2025 4.73  4.14 - 5.80 10*6/mm3 Final    Hemoglobin 07/01/2025 14.5   13.0 - 17.7 g/dL Final    Hematocrit 07/01/2025 44.1  37.5 - 51.0 % Final    MCV 07/01/2025 93.2  79.0 - 97.0 fL Final    MCH 07/01/2025 30.7  26.6 - 33.0 pg Final    MCHC 07/01/2025 32.9  31.5 - 35.7 g/dL Final    RDW 07/01/2025 12.7  12.3 - 15.4 % Final    RDW-SD 07/01/2025 44.0  37.0 - 54.0 fl Final    MPV 07/01/2025 11.7  6.0 - 12.0 fL Final    Platelets 07/01/2025 171  140 - 450 10*3/mm3 Final    Neutrophil % 07/01/2025 55.8  42.7 - 76.0 % Final    Lymphocyte % 07/01/2025 28.7  19.6 - 45.3 % Final    Monocyte % 07/01/2025 9.1  5.0 - 12.0 % Final    Eosinophil % 07/01/2025 5.7  0.3 - 6.2 % Final    Basophil % 07/01/2025 0.6  0.0 - 1.5 % Final    Immature Grans % 07/01/2025 0.1  0.0 - 0.5 % Final    Neutrophils, Absolute 07/01/2025 3.79  1.70 - 7.00 10*3/mm3 Final    Lymphocytes, Absolute 07/01/2025 1.95  0.70 - 3.10 10*3/mm3 Final    Monocytes, Absolute 07/01/2025 0.62  0.10 - 0.90 10*3/mm3 Final    Eosinophils, Absolute 07/01/2025 0.39  0.00 - 0.40 10*3/mm3 Final    Basophils, Absolute 07/01/2025 0.04  0.00 - 0.20 10*3/mm3 Final    Immature Grans, Absolute 07/01/2025 0.01  0.00 - 0.05 10*3/mm3 Final    nRBC 07/01/2025 0.0  0.0 - 0.2 /100 WBC Final        Procedure: None  Procedures     I have reviewed and agree with the above PMH, PSH, FMH, social history, medications, allergies, and labs.     Assessment/Plan:   Problem List Items Addressed This Visit       Elevated prostate specific antigen (PSA)    Relevant Orders    PSA Total+% Free (Serial)       Health Maintenance:   Health Maintenance Due   Topic Date Due    DIABETIC FOOT EXAM  Never done    DIABETIC EYE EXAM  Never done    URINE MICROALBUMIN-CREATININE RATIO (uACR)  Never done    Pneumococcal Vaccine 50+ (1 of 2 - PCV) Never done    HEPATITIS C SCREENING  Never done    ANNUAL PHYSICAL  Never done    HEMOGLOBIN A1C  Never done    COVID-19 Vaccine (4 - 2024-25 season) 09/01/2024        Smoking Counseling: Never smoked.  Never used smokeless tobacco.   Counseling given.    Urine Incontinence: Patient reports that he is not currently experiencing any symptoms of urinary incontinence.    Patient was given instructions and counseling regarding his condition or for health maintenance advice. Please see specific information pulled into the AVS if appropriate.    Patient Education:   Elevated PSA -discussed with the patient the pathophysiology of this condition in detail.  Did advise patient given family history of prostate cancer this does increase his risk.  Recent PSA was roughly 4.4 and given the use of finasteride we will double this to 8.8.  Did advise him this is an increased risk for possible prostatic carcinoma.  Previous MRI showed a PI-RADS 3 lesion educated that there is any significant increase in PSA number would recommend a repeat MRI to confirm any new lesions or change in previous PI-RADS 3 lesion.  Did discuss that a PI-RADS 4 or 5 would lead directly to a prostate biopsy.  Advised to continue with finasteride as prescribed and we will repeat a PSA in office today.  Otherwise we will place him back in 6 months    Visit Diagnoses:    ICD-10-CM ICD-9-CM   1. Elevated prostate specific antigen (PSA)  R97.20 790.93     A total of 20 minutes were spent coordinating this patient’s care in clinic today; 12 minutes of which were face-to-face with the patient, reviewing medical history and counseling on the current treatment and followup plan.  All questions were answered to patient's satisfaction.    Meds Ordered During Visit:  No orders of the defined types were placed in this encounter.      Follow Up Appointment: 6 months  No follow-ups on file.      This document has been electronically signed by Gregorio Salinas PA-C   July 17, 2025 16:16 EDT    Part of this note may be an electronic transcription/translation of spoken language to printed text using the Dragon Dictation System.

## 2025-07-21 ENCOUNTER — RESULTS FOLLOW-UP (OUTPATIENT)
Dept: UROLOGY | Facility: CLINIC | Age: 60
End: 2025-07-21
Payer: COMMERCIAL

## 2025-07-21 DIAGNOSIS — Z80.42 FAMILY HISTORY OF PROSTATE CANCER IN FATHER: ICD-10-CM

## 2025-07-21 DIAGNOSIS — R97.20 ELEVATED PROSTATE SPECIFIC ANTIGEN (PSA): Primary | ICD-10-CM

## 2025-07-21 LAB
PSA FREE MFR SERPL: 8.4 %
PSA FREE SERPL-MCNC: 0.42 NG/ML
PSA SERPL-MCNC: 5 NG/ML (ref 0–4)

## 2025-07-21 NOTE — TELEPHONE ENCOUNTER
Called patient advise PSA did slightly increase to 5 and given the current use of finasteride this would double his PSA to 10.  He did have a PI-RADS 3 lesion on MRI in 23 and I recommended either repeat MRI or further workup with biopsy.  Given his family history he wished to proceed forward with an MRI first and I will call him with those results once available.  He verbalized understanding

## 2025-08-15 ENCOUNTER — HOSPITAL ENCOUNTER (OUTPATIENT)
Dept: MRI IMAGING | Facility: HOSPITAL | Age: 60
Discharge: HOME | End: 2025-08-15
Payer: COMMERCIAL

## 2025-08-15 DIAGNOSIS — R97.20 ELEVATED PROSTATE SPECIFIC ANTIGEN (PSA): ICD-10-CM

## 2025-08-15 DIAGNOSIS — Z80.42 FAMILY HISTORY OF PROSTATE CANCER IN FATHER: ICD-10-CM

## 2025-08-15 LAB — CREAT BLDA-MCNC: 1.1 MG/DL (ref 0.6–1.3)

## 2025-08-15 PROCEDURE — 25510000001 GADOPICLENOL 0.5 MMOL/ML SOLUTION

## 2025-08-15 PROCEDURE — 72197 MRI PELVIS W/O & W/DYE: CPT

## 2025-08-15 PROCEDURE — A9573 GADOPICLENOL 0.5 MMOL/ML SOLUTION: HCPCS

## 2025-08-15 PROCEDURE — 82565 ASSAY OF CREATININE: CPT

## 2025-08-15 RX ADMIN — GADOPICLENOL 7 ML: 485.1 INJECTION INTRAVENOUS at 10:15

## 2025-08-18 ENCOUNTER — RESULTS FOLLOW-UP (OUTPATIENT)
Dept: UROLOGY | Facility: CLINIC | Age: 60
End: 2025-08-18
Payer: COMMERCIAL

## (undated) DEVICE — FRCP BX RADJAW4 NDL 2.8 240CM LG OG BX40

## (undated) DEVICE — Device: Brand: DEFENDO AIR/WATER/SUCTION AND BIOPSY VALVE

## (undated) DEVICE — ENDOGATOR AUXILIARY WATER JET CONNECTOR: Brand: ENDOGATOR

## (undated) DEVICE — ENDOGATOR TUBING FOR ENDOGATOR EGP-100 IRRIGATION PUMP,OLYMPUS OFP PUMP, OLYMPUS AFU-100 PUMP AND ERBE EIP2 PUMP: Brand: ENDOGATOR

## (undated) DEVICE — CONN Y IRR DISP 1P/U

## (undated) DEVICE — Device